# Patient Record
Sex: MALE | Race: BLACK OR AFRICAN AMERICAN | Employment: UNEMPLOYED | URBAN - METROPOLITAN AREA
[De-identification: names, ages, dates, MRNs, and addresses within clinical notes are randomized per-mention and may not be internally consistent; named-entity substitution may affect disease eponyms.]

---
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
## 2021-08-07 ENCOUNTER — HOSPITAL ENCOUNTER (EMERGENCY)
Age: 21
Discharge: HOME | End: 2021-08-07
Attending: EMERGENCY MEDICINE
Payer: MEDICAID

## 2021-08-07 VITALS
TEMPERATURE: 99.9 F | HEIGHT: 71 IN | WEIGHT: 180 LBS | HEART RATE: 93 BPM | OXYGEN SATURATION: 98 % | SYSTOLIC BLOOD PRESSURE: 113 MMHG | RESPIRATION RATE: 16 BRPM | BODY MASS INDEX: 25.2 KG/M2 | DIASTOLIC BLOOD PRESSURE: 64 MMHG

## 2021-08-07 DIAGNOSIS — K12.0 APHTHOUS ULCER: Primary | ICD-10-CM

## 2021-08-07 PROCEDURE — 99283 EMERGENCY DEPT VISIT LOW MDM: CPT

## 2021-08-07 PROCEDURE — 6370000000 HC RX 637 (ALT 250 FOR IP): Performed by: EMERGENCY MEDICINE

## 2021-08-07 RX ORDER — IBUPROFEN 800 MG/1
800 TABLET ORAL 2 TIMES DAILY PRN
Qty: 60 TABLET | Refills: 0 | Status: SHIPPED | OUTPATIENT
Start: 2021-08-07 | End: 2022-04-22

## 2021-08-07 RX ORDER — IBUPROFEN 800 MG/1
800 TABLET ORAL ONCE
Status: COMPLETED | OUTPATIENT
Start: 2021-08-07 | End: 2021-08-07

## 2021-08-07 RX ORDER — ACETAMINOPHEN 500 MG
1000 TABLET ORAL 2 TIMES DAILY PRN
Qty: 60 TABLET | Refills: 0 | Status: ON HOLD | OUTPATIENT
Start: 2021-08-07 | End: 2021-08-19 | Stop reason: HOSPADM

## 2021-08-07 RX ORDER — ACETAMINOPHEN 500 MG
1000 TABLET ORAL ONCE
Status: COMPLETED | OUTPATIENT
Start: 2021-08-07 | End: 2021-08-07

## 2021-08-07 RX ADMIN — ACETAMINOPHEN 1000 MG: 500 TABLET ORAL at 19:50

## 2021-08-07 RX ADMIN — IBUPROFEN 800 MG: 800 TABLET, FILM COATED ORAL at 19:50

## 2021-08-07 ASSESSMENT — PAIN DESCRIPTION - DESCRIPTORS: DESCRIPTORS: SHARP;CONSTANT

## 2021-08-07 ASSESSMENT — PAIN DESCRIPTION - FREQUENCY: FREQUENCY: CONTINUOUS

## 2021-08-07 ASSESSMENT — PAIN DESCRIPTION - LOCATION: LOCATION: OTHER (COMMENT)

## 2021-08-07 ASSESSMENT — PAIN SCALES - GENERAL
PAINLEVEL_OUTOF10: 9
PAINLEVEL_OUTOF10: 9

## 2021-08-07 NOTE — ED NOTES
Pt presents to the er c/o a sore under his tongue pt has a small sore under his tongue that looks like a canker sore     Lazaro Dawn RN  08/07/21 1943

## 2021-08-07 NOTE — ED PROVIDER NOTES
EMERGENCY DEPARTMENT ENCOUNTER    Pt Name: Bettye Spencer  MRN: [id number]  [place] 2000  Date of evaluation: 22  CHIEF COMPLAINT       Chief Complaint   Patient presents with    Other     tongue injury a week ago     HISTORY OF PRESENT ILLNESS   Patient is a 27-year-old male who presents the ED complaining of sores in mouth. For the past 4 days he was only eating citrus food, oranges, grapefruit, etc.,  Because he \"needed eat something \". Yesterday he developed a sore underneath his tongue and his grandma said it was from the high citrus intake. No other issues at this time. No cough, difficulty swallowing. Also, no fevers,shortness of breath, chest pain, abdominal pain, nausea, vomiting, changes in urine or stool. REVIEW OF SYSTEMS     Review of Systems   All other systems reviewed and are negative. PASTMEDICAL HISTORY     Past Medical History:   Diagnosis Date    ADHD (attention deficit hyperactivity disorder)     ON RX    Fracture 2014    LT WRIST    Gestation period, 40 weeks     VANGINAL BIRTH, BIRTH WT 8LB NO COMPLICATIONS    Guardianship     GRANDMOTHER-JUAN IS LEGAL GUARDIAN, MOTHER HAS VISITATION BUT WILL NOT BE PRESENT DAY OF SURGER     SURGICAL HISTORY       Past Surgical History:   Procedure Laterality Date    HARDWARE REMOVAL Left 10/3/14    wrist    WRIST CLOSED REDUCTION Left 2014     CURRENT MEDICATIONS       Previous Medications    DIVALPROEX (DEPAKOTE ER) 500 MG EXTENDED RELEASE TABLET    Take 2 tablets by mouth nightly    PALIPERIDONE (INVEGA) 3 MG EXTENDED RELEASE TABLET    Take 1 tablet by mouth daily     ALLERGIES     has No Known Allergies. FAMILY HISTORY     He indicated that his mother is alive. He indicated that his father is alive. He indicated that both of his sisters are alive. He indicated that both of his brothers are alive. He indicated that his maternal grandmother is alive. He indicated that his maternal grandfather is .  He indicated that his paternal grandmother is alive. He indicated that his paternal grandfather is alive. SOCIAL HISTORY       Social History     Tobacco Use    Smoking status: Passive Smoke Exposure - Never Smoker    Smokeless tobacco: Never Used   Vaping Use    Vaping Use: Some days   Substance Use Topics    Alcohol use: No    Drug use: Yes     Types: Marijuana     PHYSICAL EXAM     INITIAL VITALS: /64   Pulse 93   Temp 99.9 °F (37.7 °C) (Oral)   Resp 16   Ht 5' 11\" (1.803 m)   Wt 180 lb (81.6 kg)   SpO2 98%   BMI 25.10 kg/m²    Physical Exam  HENT:      Head: Normocephalic. Right Ear: External ear normal.      Left Ear: External ear normal.      Nose: Nose normal.      Mouth/Throat:      Tongue: Lesions present. Eyes:      Conjunctiva/sclera: Conjunctivae normal.   Cardiovascular:      Rate and Rhythm: Normal rate. Pulmonary:      Effort: Pulmonary effort is normal.   Abdominal:      General: Abdomen is flat. Skin:     General: Skin is dry. Neurological:      Mental Status: He is alert. Mental status is at baseline. Psychiatric:         Mood and Affect: Mood normal.         Behavior: Behavior normal.         MEDICAL DECISION MAKING:   The patient is hemodynamically stable, afebrile, nontoxic-appearing. Physical exam notable for hypopigmented lesion lingular frenulum. Based on history and exam likely ulcer from trauma or diet. ED plan for NSAIDs, reassurance, discharge. DIAGNOSTIC RESULTS   EKG:All EKG's are interpreted by the Emergency Department Physician who either signs or Co-signs this chart in the absence of a cardiologist.        RADIOLOGY:All plain film, CT, MRI, and formal ultrasound images (except ED bedside ultrasound) are read by the radiologist, see reports below, unless otherwisenoted in MDM or here. No orders to display     LABS: All lab results were reviewed by myself, and all abnormals are listed below.   Labs Reviewed - No data to display    EMERGENCY DEPARTMENTCOURSE:   Patient did well in the ED. Given Rx for Tylenol and ibuprofen  No further work-up indicated at this time. Nursing notes reviewed. At this time this is what I find, the patient appears well and does not appear sick or toxic. I gave my usual and customary discussion of the risks and benefits of discharge versus admission. I answered the patient's questions. I gave the patient strict return precautions. Patient expressed understanding of the discharge instructions. Dictated but not reviewed. Vitals:    Vitals:    08/07/21 1823   BP: 113/64   Pulse: 93   Resp: 16   Temp: 99.9 °F (37.7 °C)   TempSrc: Oral   SpO2: 98%   Weight: 180 lb (81.6 kg)   Height: 5' 11\" (1.803 m)       The patient was given the following medications while in the emergency department:  Orders Placed This Encounter   Medications    ibuprofen (ADVIL;MOTRIN) tablet 800 mg    acetaminophen (TYLENOL) tablet 1,000 mg    acetaminophen (TYLENOL) 500 MG tablet     Sig: Take 2 tablets by mouth 2 times daily as needed for Pain     Dispense:  60 tablet     Refill:  0    ibuprofen (ADVIL;MOTRIN) 800 MG tablet     Sig: Take 1 tablet by mouth 2 times daily as needed for Pain     Dispense:  60 tablet     Refill:  0     CONSULTS:  None    FINAL IMPRESSION      1. Aphthous ulcer          DISPOSITION/PLAN   DISPOSITION Decision To Discharge 08/07/2021 07:43:26 PM      PATIENT REFERRED TO:  Nata Spaulding MD  [address] [place] [address]  Jonnie.  [address] [place] [address]    In 2 days      DISCHARGE MEDICATIONS:  New Prescriptions    ACETAMINOPHEN (TYLENOL) 500 MG TABLET    Take 2 tablets by mouth 2 times daily as needed for Pain    IBUPROFEN (ADVIL;MOTRIN) 800 MG TABLET    Take 1 tablet by mouth 2 times daily as needed for Pain     Tariq Lemon MD  Attending Emergency Physician                    Robin Del Toro MD  08/07/21 8090

## 2021-08-16 ENCOUNTER — HOSPITAL ENCOUNTER (INPATIENT)
Age: 21
LOS: 3 days | Discharge: HOME | DRG: 754 | End: 2021-08-19
Attending: EMERGENCY MEDICINE | Admitting: PSYCHIATRY & NEUROLOGY
Payer: MEDICAID

## 2021-08-16 DIAGNOSIS — F32.A DEPRESSION WITH SUICIDAL IDEATION: Primary | ICD-10-CM

## 2021-08-16 DIAGNOSIS — R45.851 DEPRESSION WITH SUICIDAL IDEATION: Primary | ICD-10-CM

## 2021-08-16 LAB
ABSOLUTE EOS #: 0.1 K/UL (ref 0–0.4)
ABSOLUTE IMMATURE GRANULOCYTE: ABNORMAL K/UL (ref 0–0.3)
ABSOLUTE LYMPH #: 2 K/UL (ref 1.2–5.2)
ABSOLUTE MONO #: 0.5 K/UL (ref 0.1–1.3)
ACETAMINOPHEN LEVEL: <5 UG/ML (ref 10–30)
ALBUMIN SERPL-MCNC: 4.5 G/DL (ref 3.5–5.2)
ALBUMIN/GLOBULIN RATIO: NORMAL (ref 1–2.5)
ALP BLD-CCNC: 86 U/L (ref 40–129)
ALT SERPL-CCNC: 22 U/L (ref 5–41)
ANION GAP SERPL CALCULATED.3IONS-SCNC: 12 MMOL/L (ref 9–17)
AST SERPL-CCNC: 31 U/L
BASOPHILS # BLD: 1 % (ref 0–2)
BASOPHILS ABSOLUTE: 0 K/UL (ref 0–0.2)
BILIRUB SERPL-MCNC: 0.55 MG/DL (ref 0.3–1.2)
BUN BLDV-MCNC: 12 MG/DL (ref 6–20)
BUN/CREAT BLD: NORMAL (ref 9–20)
CALCIUM SERPL-MCNC: 9.6 MG/DL (ref 8.6–10.4)
CHLORIDE BLD-SCNC: 103 MMOL/L (ref 98–107)
CO2: 26 MMOL/L (ref 20–31)
CREAT SERPL-MCNC: 0.9 MG/DL (ref 0.7–1.2)
DIFFERENTIAL TYPE: ABNORMAL
EOSINOPHILS RELATIVE PERCENT: 1 % (ref 0–4)
ETHANOL PERCENT: <0.01 %
ETHANOL: <10 MG/DL
GFR AFRICAN AMERICAN: >60 ML/MIN
GFR NON-AFRICAN AMERICAN: >60 ML/MIN
GFR SERPL CREATININE-BSD FRML MDRD: NORMAL ML/MIN/{1.73_M2}
GFR SERPL CREATININE-BSD FRML MDRD: NORMAL ML/MIN/{1.73_M2}
GLUCOSE BLD-MCNC: 84 MG/DL (ref 70–99)
HCT VFR BLD CALC: 40.1 % (ref 41–53)
HEMOGLOBIN: 12.9 G/DL (ref 13.5–17.5)
IMMATURE GRANULOCYTES: ABNORMAL %
LYMPHOCYTES # BLD: 23 % (ref 25–45)
MCH RBC QN AUTO: 26.9 PG (ref 26–34)
MCHC RBC AUTO-ENTMCNC: 32.3 G/DL (ref 31–37)
MCV RBC AUTO: 83.4 FL (ref 80–100)
MONOCYTES # BLD: 5 % (ref 2–8)
NRBC AUTOMATED: ABNORMAL PER 100 WBC
PDW BLD-RTO: 14.4 % (ref 11.5–14.9)
PLATELET # BLD: 358 K/UL (ref 150–450)
PLATELET ESTIMATE: ABNORMAL
PMV BLD AUTO: 6.9 FL (ref 6–12)
POTASSIUM SERPL-SCNC: 4.2 MMOL/L (ref 3.7–5.3)
RBC # BLD: 4.8 M/UL (ref 4.5–5.9)
RBC # BLD: ABNORMAL 10*6/UL
SALICYLATE LEVEL: <1 MG/DL (ref 3–10)
SARS-COV-2, RAPID: NOT DETECTED
SEG NEUTROPHILS: 70 % (ref 34–64)
SEGMENTED NEUTROPHILS ABSOLUTE COUNT: 6.4 K/UL (ref 1.3–9.1)
SODIUM BLD-SCNC: 141 MMOL/L (ref 135–144)
SPECIMEN DESCRIPTION: NORMAL
TOTAL PROTEIN: 8.1 G/DL (ref 6.4–8.3)
WBC # BLD: 8.9 K/UL (ref 4.5–13.5)
WBC # BLD: ABNORMAL 10*3/UL

## 2021-08-16 PROCEDURE — 90792 PSYCH DIAG EVAL W/MED SRVCS: CPT | Performed by: PSYCHIATRY & NEUROLOGY

## 2021-08-16 PROCEDURE — APPSS30 APP SPLIT SHARED TIME 16-30 MINUTES: Performed by: PSYCHIATRY & NEUROLOGY

## 2021-08-16 PROCEDURE — 80307 DRUG TEST PRSMV CHEM ANLYZR: CPT

## 2021-08-16 PROCEDURE — 87635 SARS-COV-2 COVID-19 AMP PRB: CPT

## 2021-08-16 PROCEDURE — 85025 COMPLETE CBC W/AUTO DIFF WBC: CPT

## 2021-08-16 PROCEDURE — G0480 DRUG TEST DEF 1-7 CLASSES: HCPCS

## 2021-08-16 PROCEDURE — 36415 COLL VENOUS BLD VENIPUNCTURE: CPT

## 2021-08-16 PROCEDURE — 99285 EMERGENCY DEPT VISIT HI MDM: CPT

## 2021-08-16 PROCEDURE — 80143 DRUG ASSAY ACETAMINOPHEN: CPT

## 2021-08-16 PROCEDURE — 80053 COMPREHEN METABOLIC PANEL: CPT

## 2021-08-16 PROCEDURE — 80179 DRUG ASSAY SALICYLATE: CPT

## 2021-08-16 PROCEDURE — 1240000000 HC EMOTIONAL WELLNESS R&B

## 2021-08-16 RX ORDER — DIPHENHYDRAMINE HYDROCHLORIDE 50 MG/ML
50 INJECTION INTRAMUSCULAR; INTRAVENOUS EVERY 4 HOURS PRN
Status: DISCONTINUED | OUTPATIENT
Start: 2021-08-16 | End: 2021-08-19 | Stop reason: HOSPADM

## 2021-08-16 RX ORDER — POLYETHYLENE GLYCOL 3350 17 G/17G
17 POWDER, FOR SOLUTION ORAL DAILY PRN
Status: DISCONTINUED | OUTPATIENT
Start: 2021-08-16 | End: 2021-08-19 | Stop reason: HOSPADM

## 2021-08-16 RX ORDER — HALOPERIDOL 5 MG
5 TABLET ORAL EVERY 4 HOURS PRN
Status: DISCONTINUED | OUTPATIENT
Start: 2021-08-16 | End: 2021-08-19 | Stop reason: HOSPADM

## 2021-08-16 RX ORDER — HALOPERIDOL 5 MG/ML
5 INJECTION INTRAMUSCULAR EVERY 4 HOURS PRN
Status: DISCONTINUED | OUTPATIENT
Start: 2021-08-16 | End: 2021-08-19 | Stop reason: HOSPADM

## 2021-08-16 RX ORDER — ACETAMINOPHEN 325 MG/1
650 TABLET ORAL EVERY 4 HOURS PRN
Status: DISCONTINUED | OUTPATIENT
Start: 2021-08-16 | End: 2021-08-19 | Stop reason: HOSPADM

## 2021-08-16 RX ORDER — MAGNESIUM HYDROXIDE/ALUMINUM HYDROXICE/SIMETHICONE 120; 1200; 1200 MG/30ML; MG/30ML; MG/30ML
30 SUSPENSION ORAL EVERY 6 HOURS PRN
Status: DISCONTINUED | OUTPATIENT
Start: 2021-08-16 | End: 2021-08-19 | Stop reason: HOSPADM

## 2021-08-16 RX ORDER — IBUPROFEN 200 MG
400 TABLET ORAL EVERY 6 HOURS PRN
Status: DISCONTINUED | OUTPATIENT
Start: 2021-08-16 | End: 2021-08-19 | Stop reason: HOSPADM

## 2021-08-16 RX ORDER — TRAZODONE HYDROCHLORIDE 50 MG/1
50 TABLET ORAL NIGHTLY PRN
Status: DISCONTINUED | OUTPATIENT
Start: 2021-08-16 | End: 2021-08-19 | Stop reason: HOSPADM

## 2021-08-16 RX ORDER — HYDROXYZINE 50 MG/1
50 TABLET, FILM COATED ORAL 3 TIMES DAILY PRN
Status: DISCONTINUED | OUTPATIENT
Start: 2021-08-16 | End: 2021-08-19 | Stop reason: HOSPADM

## 2021-08-16 RX ORDER — LORAZEPAM 2 MG/ML
2 INJECTION INTRAMUSCULAR EVERY 4 HOURS PRN
Status: DISCONTINUED | OUTPATIENT
Start: 2021-08-16 | End: 2021-08-19 | Stop reason: HOSPADM

## 2021-08-16 RX ORDER — LORAZEPAM 1 MG/1
2 TABLET ORAL EVERY 4 HOURS PRN
Status: DISCONTINUED | OUTPATIENT
Start: 2021-08-16 | End: 2021-08-19 | Stop reason: HOSPADM

## 2021-08-16 ASSESSMENT — ENCOUNTER SYMPTOMS
BACK PAIN: 0
SHORTNESS OF BREATH: 0
ABDOMINAL PAIN: 0

## 2021-08-16 ASSESSMENT — PATIENT HEALTH QUESTIONNAIRE - PHQ9: SUM OF ALL RESPONSES TO PHQ QUESTIONS 1-9: 6

## 2021-08-16 ASSESSMENT — SLEEP AND FATIGUE QUESTIONNAIRES
DO YOU HAVE DIFFICULTY SLEEPING: NO
AVERAGE NUMBER OF SLEEP HOURS: 6
DO YOU USE A SLEEP AID: NO

## 2021-08-16 ASSESSMENT — LIFESTYLE VARIABLES
HISTORY_ALCOHOL_USE: NO
HISTORY_ALCOHOL_USE: NO
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
## 2021-08-16 NOTE — GROUP NOTE
Group Therapy Note    Date: 8/16/2021    Group Start Time: 1430  Group End Time: 1585  Group Topic: Cognitive Skills    BALDO SAWYER A    Geanie Fluke    Patient declined to attend social skills group at 1430 despite encouragement from staff. 1:1 talk time offered by staff as alternative to group session.         Signature:  Juju Vergara

## 2021-08-16 NOTE — BH NOTE
Suicide precautions discontinued. Patient denies suicidal ideations upon admission. Patient rounded on every 15 minutes and at regular intervals per policy. Doctor aware. No order for 1:1 observation. Will continue to monitor.
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
## 2021-08-16 NOTE — CARE COORDINATION
clearance from a psychiatrist so he can join the [employer]. He stated \"my whole life has been shit and I know with the  it can be better\". Marijuana use is reported but patient denies any other substance use. Patient presented with good insight, good eye contact, and no thought blocking. He does have racing thoughts and reports past diagnosis of ADHD as a child.  will continue to offer support and encouragement to patient as needed.

## 2021-08-16 NOTE — BH NOTE
[facility]  Admission Note     Admission Type:   Admission Type: Voluntary    Reason for admission:  Reason for Admission: Suicidal ideation to run in traffic, overdose, or death by     PATIENT STRENGTHS:  Strengths: Connection to output provider, Positive Support (Zepf)    Patient Strengths and Limitations:  Limitations: General negative or hopeless attitude about future/recovery, Hopeless about future    Addictive Behavior:   Addictive Behavior  In the past 3 months, have you felt or has someone told you that you have a problem with:  : None  Do you have a history of Chemical Use?: No  Do you have a history of Alcohol Use?: No  Do you have a history of Street Drug Abuse?: Yes (\"marijuana\")  Histroy of Prescripton Drug Abuse?: No    Medical Problems:   Past Medical History:   Diagnosis Date    ADHD (attention deficit hyperactivity disorder) 2005    ON RX    Fracture 08/2014    LT WRIST    Gestation period, 40 weeks     VANGINAL BIRTH, BIRTH WT 8LB NO COMPLICATIONS    Guardianship     GRANDMOTHER-JUAN IS LEGAL GUARDIAN, MOTHER HAS VISITATION BUT WILL NOT BE PRESENT DAY OF SURGER       Status EXAM:  Status and Exam  Normal: No  Facial Expression: Avoids Gaze, Worried, Sad  Affect: Congruent  Level of Consciousness: Alert  Mood:Normal: No  Mood: Depressed, Sad  Motor Activity:Normal: Yes  Interview Behavior: Cooperative, Evasive  Preception: [place] to Person, Nathalie Cheikh to Time, [place] to Place, [place] to Situation  Attention:Normal: No  Attention: Distractible  Thought Processes: Blocking  Thought Content:Normal: No  Thought Content: Preoccupations  Hallucinations: None  Delusions: No  Memory:Normal: Yes  Insight and Judgment: No  Insight and Judgment: Poor Judgment, Poor Insight  Present Suicidal Ideation: No (denies)  Present Homicidal Ideation: No    Tobacco Screening:  Practical Counseling, on admission, lisha X, if applicable and completed (first 3 are required if patient doesn't refuse): ( )  Recognizing danger situations (included triggers and roadblocks)                    ( )  Coping skills (new ways to manage stress, exercise, relaxation techniques, changing routine, distraction)                                                           ( )  Basic information about quitting (benefits of quitting, techniques in how to quit, available resources  ( ) Referral for counseling faxed to [facility]                                           ( ) Patient refused counseling  (X) Patient has not smoked in the last 30 days    Metabolic Screening:    Lab Results   Component Value Date    LABA1C 5.6 04/11/2020       Lab Results   Component Value Date    CHOL 132 04/11/2020     Lab Results   Component Value Date    TRIG 51 04/11/2020     Lab Results   Component Value Date    HDL 37 (L) 04/11/2020     No components found for: LDLCAL  No results found for: LABVLDL      There is no height or weight on file to calculate BMI. BP Readings from Last 2 Encounters:   08/16/21 134/79   08/07/21 113/64           Pt admitted with followings belongings:  Dentures: None  Vision - Corrective Lenses: None  Hearing Aid: None  Jewelry: None  Body Piercings Removed: N/A  Clothing: Footwear, Pants, Shirt, Socks  Were All Patient Medications Collected?: Not Applicable  Other Valuables: Cell phone, [facility]     Patient's home medications were verified. Patient oriented to surroundings and program expectations and copy of patient rights given. Received admission packet:  Yes. Consents reviewed, signed all. Refused none. Patient verbalize understanding: yes. Patient education on precautions: yes                 Jesús Waite RN       Patient voluntary from [facility] AN AFFILIATE OF [facility] for suicidal ideations to overdose, run into traffic, or death by . Patient has been off Invega injection for 9 weeks, stating \"serves me no purpose. \" Linked to [facility]. Patient states \"I am fed up with life. \" Calm and cooperative with admission process, all consents signed. Routines started and home medications verified and completed. Patient states he \"will not take medications while here. \" NP notified of admission.

## 2021-08-16 NOTE — GROUP NOTE
Group Therapy Note    Date: 8/16/2021    Group Start Time: 1100  Group End Time: 1150  Group Topic: Cognitive Skills    BALDO Becerra    Patient declined to attend social skills group at 1100 despite encouragement from staff. 1:1 talk time offered by staff as alternative to group session.           Signature:  Melissa Becerra

## 2021-08-16 NOTE — ED NOTES
[facility] called this date Dr Colby Schaumann to admit under MDD with SI.  Bed request and voluntary faxed to [facility].

## 2021-08-16 NOTE — PLAN OF CARE
[facility]  Initial Interdisciplinary Treatment Plan NO      Original treatment plan Date & Time: 8/16/2021  1113    Admission Type:  Admission Type: Voluntary    Reason for admission:   Reason for Admission: Suicidal ideation to run in traffic, overdose, or death by     Estimated Length of Stay:  5-7days  Estimated Discharge Date: to be determined by physician    PATIENT STRENGTHS:  Patient Strengths:Strengths: Connection to output provider, Positive Support (Zepf)  Patient Strengths and Limitations:Limitations: General negative or hopeless attitude about future/recovery, Hopeless about future  Addictive Behavior:    Medical Problems:  Past Medical History:   Diagnosis Date    ADHD (attention deficit hyperactivity disorder) 2005    ON RX    Fracture 08/2014    LT WRIST    Gestation period, 40 weeks     VANGINAL BIRTH, BIRTH WT 8LB NO COMPLICATIONS    Guardianship     GRANDMOTHER-JUAN IS LEGAL GUARDIAN, MOTHER HAS VISITATION BUT WILL NOT BE PRESENT DAY OF SURGER     Status EXAM:Status and Exam  Normal: No  Facial Expression: Avoids Gaze, Worried, Sad  Affect: Congruent  Level of Consciousness: Alert  Mood:Normal: No  Mood: Depressed, Sad  Motor Activity:Normal: Yes  Interview Behavior: Cooperative, Evasive  Preception: [place] to Person, Burnetta Abelson to Time, [place] to Place, [place] to Situation  Attention:Normal: No  Attention: Distractible  Thought Processes: Blocking  Thought Content:Normal: No  Thought Content: Preoccupations  Hallucinations: None  Delusions: No  Memory:Normal: Yes  Insight and Judgment: No  Insight and Judgment: Poor Judgment, Poor Insight  Present Suicidal Ideation: No (denies)  Present Homicidal Ideation: No    EDUCATION:   Learner Progress Toward Treatment Goals: reviewed group plans and strategies for care    Method:group therapy, medication compliance, individualized assessments and care planning    Outcome: needs reinforcement    PATIENT GOALS: to be discussed with patient within 72 hours    PLAN/TREATMENT RECOMMENDATIONS:     continue group therapy , medications compliance, goal setting, individualized assessments and care, continue to monitor pt on unit      SHORT-TERM GOALS:   Time frame for Short-Term Goals: 5-7 days    LONG-TERM GOALS:  Time frame for Long-Term Goals: 6 months  Members Present in Team Meeting: See Signature Sheet    TERESA Guerra
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
## 2021-08-16 NOTE — ED TRIAGE NOTES
Mode of arrival (squad #, walk in, police, etc) : [address] [facility]        Chief complaint(s): Suicidal        Arrival Note (brief scenario, treatment PTA, etc). : Pt arrives to ED c/o suicidal ideation. Patient states that these thoughts started this morning. Patient states that he took himself off his Oral Freda about 9 months ago. Patient states that he does not feel like he needs medication, he wants help with coping mechanisms. Patient is not on any daily medications at this time. Patient reports multiple plans for his suicidal thoughts including \"death by \" or running in front of a car. Patient reports his most recent attempt was in January of this year. Patient denies any homicidal ideation. Patient denies any pain. Patient is calm and cooperative with staff. C= \"Have you ever felt that you should Cut down on your drinking? \"  No  A= \"Have people Annoyed you by criticizing your drinking? \"  No  G= \"Have you ever felt bad or Guilty about your drinking? \"  No  E= \"Have you ever had a drink as an Eye-opener first thing in the morning to steady your nerves or to help a hangover? \"  No      Deferred []      Reason for deferring: N/A    *If yes to two or more: probable alcohol abuse. *

## 2021-08-16 NOTE — PROGRESS NOTES
Behavioral Services  Medicare Certification Upon Admission    I certify that this patient's inpatient psychiatric hospital admission is medically necessary for:    [x] (1) Treatment which could reasonably be expected to improve this patient's condition,       [x] (2) Or for diagnostic study;     AND     [x](2) The inpatient psychiatric services are provided while the individual is under the care of a physician and are included in the individualized plan of care.     Estimated length of stay/service 3-5 days    Plan for post-hospital care [id number]    Electronically signed by Agnes Suarez MD on 8/16/2021 at 2:02 PM

## 2021-08-16 NOTE — ED PROVIDER NOTES
Current Discharge Medication List      CONTINUE these medications which have NOT CHANGED    Details   acetaminophen (TYLENOL) 500 MG tablet Take 2 tablets by mouth 2 times daily as needed for Pain  Qty: 60 tablet, Refills: 0      ibuprofen (ADVIL;MOTRIN) 800 MG tablet Take 1 tablet by mouth 2 times daily as needed for Pain  Qty: 60 tablet, Refills: 0      divalproex (DEPAKOTE ER) 500 MG extended release tablet Take 2 tablets by mouth nightly  Qty: 30 tablet, Refills: 3      paliperidone (INVEGA) 3 MG extended release tablet Take 1 tablet by mouth daily  Qty: 30 tablet, Refills: 3             ALLERGIES     has No Known Allergies. FAMILY HISTORY     He indicated that his mother is alive. He indicated that his father is alive. He indicated that both of his sisters are alive. He indicated that both of his brothers are alive. He indicated that his maternal grandmother is alive. He indicated that his maternal grandfather is . He indicated that his paternal grandmother is alive. He indicated that his paternal grandfather is alive. SOCIAL HISTORY      reports that he is a non-smoker but has been exposed to tobacco smoke. He has never used smokeless tobacco. He reports current drug use. Drug: Marijuana. He reports that he does not drink alcohol. PHYSICAL EXAM     INITIAL VITALS: /60   Pulse 67   Temp 97.9 °F (36.6 °C) (Oral)   Resp 14   Ht 5' 11\" (1.803 m)   Wt 180 lb (81.6 kg)   SpO2 98%   BMI 25.10 kg/m²   Gen: NAD  Head: Normocephalic, atraumatic  Eye: Pupils equal round reactive to light, no conjunctivitis  Heart: Regular rate and rhythm no murmurs  Lungs: Clear to auscultation bilaterally, no respiratory distress  Chest wall: No crepitus, no tenderness palpation  Abdomen: Soft, nontender, nondistended, with no peritoneal signs  Skin: No diaphoresis. no lacerations.   Neurologic: Patient is alert and oriented x3, motor and sensation is intact in all 4 extremities, speech is fluent  Extremities: Full range of motion, no cyanosis, no edema, no signs of trauma, no tenderness to palpation    MEDICAL DECISION MAKING:     MDM  22 y.o. male with depression, suicidal thoughts, suicidal plan. previous suicide attempt. The patient does not appear intoxicated. The patient is showing no signs of any acute active toxidrome. The patient denies any overdose attempt or  medical complaints at this time. We'll screen for any acetaminophen or salicylate ingestion, we'll check baseline renal function, liver function, a drug screen, cbc and reassess. Hospital Course:     Laboratory studies reviewed and unremarkable. Patient is medically clear for psychiatric evaluation.  will discuss the case with the psychiatry service, anticipate inpatient psychiatric admission. DIAGNOSTIC RESULTS     LABS: All lab results were reviewed by myself, and all abnormals are listed below.   Labs Reviewed   CBC WITH AUTO DIFFERENTIAL - Abnormal; Notable for the following components:       Result Value    Hemoglobin 12.9 (*)     Hematocrit 40.1 (*)     Seg Neutrophils 70 (*)     Lymphocytes 23 (*)     All other components within normal limits   ACETAMINOPHEN LEVEL - Abnormal; Notable for the following components:    Acetaminophen Level <5 (*)     All other components within normal limits   SALICYLATE LEVEL - Abnormal; Notable for the following components:    Salicylate Lvl <1 (*)     All other components within normal limits   COVID-19, RAPID   COMPREHENSIVE METABOLIC PANEL   ETHANOL   URINE DRUG SCREEN       EMERGENCY DEPARTMENT COURSE:   Vitals:    Vitals:    08/16/21 1132 08/16/21 2015 08/17/21 0800 08/17/21 2015   BP:  108/65 (!) 120/44 122/60   Pulse:  87 57 67   Resp:  14 14 14   Temp:  98.6 °F (37 °C) 97.8 °F (36.6 °C) 97.9 °F (36.6 °C)   TempSrc:   Oral Oral   SpO2:       Weight: 180 lb (81.6 kg)      Height: 5' 11\" (1.803 m)        The patient was given the following medications while in the emergency department:  Orders Placed This Encounter   Medications    acetaminophen (TYLENOL) tablet 650 mg    aluminum & magnesium hydroxide-simethicone (MAALOX) 200-200-20 MG/5ML suspension 30 mL    hydrOXYzine (ATARAX) tablet 50 mg    ibuprofen (ADVIL;MOTRIN) tablet 400 mg    traZODone (DESYREL) tablet 50 mg    polyethylene glycol (GLYCOLAX) packet 17 g    nicotine polacrilex (NICORETTE) gum 2 mg    AND Linked Order Group     haloperidol (HALDOL) tablet 5 mg     LORazepam (ATIVAN) tablet 2 mg    AND Linked Order Group     haloperidol lactate (HALDOL) injection 5 mg     LORazepam (ATIVAN) injection 2 mg     diphenhydrAMINE (BENADRYL) injection 50 mg    mirtazapine (REMERON) tablet 7.5 mg   -------------------------  CRITICAL CARE:   CONSULTS: None  PROCEDURES: Procedures     FINAL IMPRESSION      1. Depression with suicidal ideation          DISPOSITION/PLAN   DISPOSITION      PATIENT REFERRED TO:  No follow-up provider specified.     DISCHARGE MEDICATIONS:  Current Discharge Medication List        Iván Keith MD  Attending Emergency Physician        Iván Keith MD  08/17/21 1586

## 2021-08-17 PROCEDURE — 99232 SBSQ HOSP IP/OBS MODERATE 35: CPT | Performed by: PSYCHIATRY & NEUROLOGY

## 2021-08-17 PROCEDURE — 1240000000 HC EMOTIONAL WELLNESS R&B

## 2021-08-17 PROCEDURE — APPSS30 APP SPLIT SHARED TIME 16-30 MINUTES: Performed by: PSYCHIATRY & NEUROLOGY

## 2021-08-17 RX ORDER — MIRTAZAPINE 15 MG/1
7.5 TABLET, FILM COATED ORAL NIGHTLY
Status: DISCONTINUED | OUTPATIENT
Start: 2021-08-17 | End: 2021-08-19 | Stop reason: HOSPADM

## 2021-08-17 NOTE — GROUP NOTE
Group Therapy Note    Date: 8/17/2021    Group Start Time: 1330  Group End Time: 7984  Group Topic: Recreational     [address]        Group Therapy Note    Attendees: 4/16         Patient's Goal:  To demonstrate improved concentration using recreation    Notes:  Patient was pleasant and appropriate throughout the session     Status After Intervention:  Improved    Participation Level:  Active Listener and Interactive    Participation Quality: Appropriate, Attentive, Sharing and Supportive      Speech:  normal      Thought Process/Content: Logical      Affective Functioning:flat    Mood: depressed       Level of consciousness:  Alert, Oriented       Response to Learning: Able to verbalize current knowledge/experience, Able to verbalize/acknowledge new learning, Capable of insight and Progressing to goal      Endings: None Reported    Modes of Intervention: Education, Support, Socialization, Exploration, Clarifying and Problem-solving      Discipline Responsible: Psychoeducational Specialist      Signature:  Sarah Gipson

## 2021-08-17 NOTE — PLAN OF CARE
Problem: Suicide risk  Goal: Provide patient with safe environment  Description: Provide patient with safe environment  8/16/2021 2301 by Zeno Hodgkin, RN  Outcome: Ongoing   Safe environment provided. Problem: Altered Mood, Depressive Behavior:  Goal: Ability to disclose and discuss suicidal ideas will improve  Description: Ability to disclose and discuss suicidal ideas will improve  8/16/2021 2301 by Zeno Hodgkin, RN  Outcome: Ongoing   Denies current suicidal ideation.

## 2021-08-17 NOTE — PLAN OF CARE
[address] [place] [address]  Day 3 Interdisciplinary Treatment Plan NOTE    Review Date & Time: 8/17/2021 0933    Admission Type:   Admission Type: Voluntary    Reason for admission:  Reason for Admission: Suicidal ideation to run in traffic, overdose, or death by   Estimated Length of Stay: 5-7 days  Estimated Discharge Date Update: to be determined by physician    PATIENT STRENGTHS:  Patient Strengths Strengths: Connection to output provider, Positive Support (Zepf)  Patient Strengths and Limitations:Limitations: Difficulty problem solving/relies on others to help solve problems  Addictive Behavior:Addictive Behavior  In the past 3 months, have you felt or has someone told you that you have a problem with:  : None  Do you have a history of Chemical Use?: No  Do you have a history of Alcohol Use?: No  Do you have a history of Street Drug Abuse?: Yes  Histroy of Prescripton Drug Abuse?: No  Medical Problems:  Past Medical History:   Diagnosis Date    ADHD (attention deficit hyperactivity disorder) 2005    ON RX    Fracture 08/2014    LT WRIST    Gestation period, 40 weeks     VANGINAL BIRTH, BIRTH WT 8LB NO COMPLICATIONS    Guardianship     GRANDMOTHER-JUAN IS LEGAL GUARDIAN, MOTHER HAS VISITATION BUT WILL NOT BE PRESENT DAY OF SURGER       Risk:  Fall RiskTotal: 55  Yonathan Scale Yonathan Scale Score: 22  BVC Total: 0  Change in scores no Changes to plan of Care no    Status EXAM:   Status and Exam  Normal: No  Facial Expression: Brightened  Affect: Appropriate  Level of Consciousness: Alert  Mood:Normal: No  Mood: Depressed  Motor Activity:Normal: Yes  Interview Behavior: Cooperative, Evasive  Preception: [place] to Person, Shyrl Plump to Time, [place] to Place, [place] to Situation  Attention:Normal: Yes  Attention: Distractible  Thought Processes: Blocking  Thought Content:Normal: Yes  Thought Content: Preoccupations  Hallucinations: None  Delusions: No  Memory:Normal: Yes  Insight and Judgment: No  Insight and Judgment: Poor Insight  Present Suicidal Ideation: No  Present Homicidal Ideation: No    Daily Assessment Last Entry:             Patient Currently in Pain: No       Patient Monitoring:       Psychiatric Symptoms:                    Suicide Risk CSSR-S:  1) Within the past month, have you wished you were dead or wished you could go to sleep and not wake up? : Yes (\"Fed up with life\" \"I don't want to be here anymore\")  2) Have you actually had any thoughts of killing yourself? : Yes  3) Have you been thinking about how you might kill yourself? : Yes  5) Have you started to work out or worked out the details of how to kill yourself?  Do you intend to carry out this plan? : Yes  6) Have you ever done anything, started to do anything, or prepared to do anything to end your life?: Yes  Change in Result no Change in Plan of care no      EDUCATION:   EDUCATION:   Learner Progress Toward Treatment Goals: Reviewed results and recommendations of this team, Reviewed group plan and strategies, Reviewed signs, symptoms and risk of self harm and violent behavior, Reviewed goals and plan of care    Method:small group, individual verbal education    Outcome:verbalized by patient, but needs reinforcement to obtain goals    PATIENT GOALS:  Short term: pt refuses to attend tx planning to develop goals   Long term: pt refuses to attend tx planning to develop goals    PLAN/TREATMENT RECOMMENDATIONS UPDATE: continue with group therapies, increased socialization, continue planning for after discharge goals, continue with medication compliance    SHORT-TERM GOALS UPDATE:   Time frame for Short-Term Goals: 5-7 days    LONG-TERM GOALS UPDATE:   Time frame for Long-Term Goals: 6 months  Members Present in Team Meeting: See Signature Sheet    TERESA Diaz

## 2021-08-17 NOTE — GROUP NOTE
Group Therapy Note    Date: 8/16/2021    Group Start Time: 2030  Group End Time: 2128  Group Topic: Wrap-Up    [facility]        Group Therapy Note    Attendees: 8         Patient's Goal:  I have been through a lot, my family is no help to me I only have 1 support person I can turn to. Notes:  I got lots of goals, I really want to go into the service. Status After Intervention:  Improved    Participation Level:  Active Listener    Participation Quality: Appropriate, Attentive and Sharing      Speech:  normal      Thought Process/Content: Linear      Affective Functioning: Congruent      Mood: anxious      Level of consciousness:  Alert, Oriented x4 and Attentive      Response to Learning: Able to verbalize/acknowledge new learning      Endings: None Reported    Modes of Intervention: Problem-solving      Discipline Responsible: [employer]      Signature:  Juana Wesley

## 2021-08-17 NOTE — PLAN OF CARE
Problem: Suicide risk  Goal: Provide patient with safe environment  Description: Provide patient with safe environment  8/17/2021 1130 by Concetta Cerna  Outcome: Ongoing  Pt denies suicidal thoughts when questioned. Isolates in room for long intervals. Pt. Remains safe on the unit. Q 15 minute checks for safety maintained. Problem: Altered Mood, Depressive Behavior:  Goal: Able to verbalize acceptance of life and situations over which he or she has no control  Description: Able to verbalize acceptance of life and situations over which he or she has no control  8/17/2021 1130 by Concetta Cerna  Outcome: Ongoing  Pt out for meals only. Refuses to take any medication and states it does not help him to take medication. Denies hallucinations. Problem: Altered Mood, Depressive Behavior:  Goal: Able to verbalize and/or display a decrease in depressive symptoms  Description: Able to verbalize and/or display a decrease in depressive symptoms  8/17/2021 1130 by Concetta Cerna  Outcome: Ongoing  Isolates in bed. Allows vitals. Answers questions posed. Pt is controlled and cooperative when approached. Problem: Altered Mood, Depressive Behavior:  Goal: Ability to disclose and discuss suicidal ideas will improve  Description: Ability to disclose and discuss suicidal ideas will improve  8/17/2021 1130 by Concetta Cerna  Outcome: Ongoing  Pt denies suicidal thoughts. Pt. Remains safe on the unit. Q 15 minute checks for safety maintained.

## 2021-08-17 NOTE — PROGRESS NOTES
BEHAVIORAL HEALTH FOLLOW-UP NOTE     8/17/2021     Patient was seen and examined in person, Chart reviewed   Patient's case discussed with staff/team   Patient attending groups: No   Patient compliant with medication: Declines offered medications    Chief Complaint: Depression with suicidal ideation    Interim History:   Nursing staff reports that patient has continued to isolate in his room for large percentage of the time since his admission. He has not required emergency or needed medications and continues to decline offered Remeron. Today he reports his mood is \"fine \"and denies suicidal ideation. He remains very fixated on receiving clearance so that he may join the [employer]. He declines offered to attend group programming and reports that the food this morning for for breakfast was not his usual fresh fruits. Explored his symptoms of depression and the benefits that Remeron would offer and he is insistent that he would like therapy only at this time. He currently contracts for safety on this unit. The team will continue to monitor him closely and work to establish therapeutic alliance. He denies having questions or concerns currently.     Appetite:   [] Normal/Unchanged  [] Increased  [] Decreased      Sleep:       [x] Normal/Unchanged  [] Fair       [] Poor              Energy:    [] Normal/Unchanged  [] Increased  [x] Decreased        Aggression:  [] yes  [x] no    Patient is [x] able  [] unable to CONTRACT FOR SAFETY ON THE UNIT    PAST MEDICAL/PSYCHIATRIC HISTORY:   Past Medical History:   Diagnosis Date    ADHD (attention deficit hyperactivity disorder) 2005    ON RX    Fracture 08/2014    LT WRIST    Gestation period, 40 weeks     VANGINAL BIRTH, BIRTH WT 8LB NO COMPLICATIONS    Guardianship     GRANDMOTHER-JUAN IS LEGAL GUARDIAN, MOTHER HAS VISITATION BUT WILL NOT BE PRESENT DAY OF SURGER       FAMILY/SOCIAL HISTORY:  Family History   Problem Relation Age of Onset    Diabetes Paternal Grandmother      Social History     Socioeconomic History    Marital status: Single     Spouse name: Not on file    Number of children: Not on file    Years of education: Not on file    Highest education level: Not on file   Occupational History    Not on file   Tobacco Use    Smoking status: Passive Smoke Exposure - Never Smoker    Smokeless tobacco: Never Used   Vaping Use    Vaping Use: Some days   Substance and Sexual Activity    Alcohol use: No    Drug use: Yes     Types: Marijuana    Sexual activity: Not on file   Other Topics Concern    Not on file   Social History Narrative    Not on file     Social Determinants of Health     Financial Resource Strain:     Difficulty of Paying Living Expenses:    Food Insecurity:     Worried About Running Out of Food in the Last Year:     Ran Out of Food in the Last Year:    Transportation Needs:     Lack of Transportation (Medical):  Lack of Transportation (Non-Medical):    Physical Activity:     Days of Exercise per Week:     Minutes of Exercise per Session:    Stress:     Feeling of Stress :    Social Connections:     Frequency of Communication with Friends and Family:     Frequency of Social Gatherings with Friends and Family:     Attends Tenriism Services:     Active Member of Clubs or Organizations:     Attends Club or Organization Meetings:     Marital Status:    Intimate Partner Violence:     Fear of Current or Ex-Partner:     Emotionally Abused:     Physically Abused:     Sexually Abused:            ROS:  [x] All negative/unchanged except if checked.  Explain positive(checked items) below:  [] Constitutional  [] Eyes  [] Ear/Nose/Mouth/Throat  [] Respiratory  [] CV  [] GI  []   [] Musculoskeletal  [] Skin/Breast  [] Neurological  [] Endocrine  [] Heme/Lymph  [] Allergic/Immunologic    Explanation:     MEDICATIONS:    Current Facility-Administered Medications:     mirtazapine (REMERON) tablet 7.5 mg, 7.5 mg, Oral, Nightly, Kaycee Edd Ríos APRN - CNP    acetaminophen (TYLENOL) tablet 650 mg, 650 mg, Oral, Q4H PRN, Shanita Saleh MD    aluminum & magnesium hydroxide-simethicone (MAALOX) 200-200-20 MG/5ML suspension 30 mL, 30 mL, Oral, Q6H PRN, Shanita Saleh MD    hydrOXYzine (ATARAX) tablet 50 mg, 50 mg, Oral, TID PRN, Shanita Saleh MD    ibuprofen (ADVIL;MOTRIN) tablet 400 mg, 400 mg, Oral, Q6H PRN, Shanita Saleh MD    traZODone (DESYREL) tablet 50 mg, 50 mg, Oral, Nightly PRN, Shanita Saleh MD    polyethylene glycol (GLYCOLAX) packet 17 g, 17 g, Oral, Daily PRN, Shanita Saleh MD    nicotine polacrilex (NICORETTE) gum 2 mg, 2 mg, Oral, Q1H PRN, Shanita Saleh MD    haloperidol (HALDOL) tablet 5 mg, 5 mg, Oral, Q4H PRN **AND** LORazepam (ATIVAN) tablet 2 mg, 2 mg, Oral, Q4H PRN, Shanita Saleh MD    haloperidol lactate (HALDOL) injection 5 mg, 5 mg, Intramuscular, Q4H PRN **AND** LORazepam (ATIVAN) injection 2 mg, 2 mg, Intramuscular, Q4H PRN **AND** diphenhydrAMINE (BENADRYL) injection 50 mg, 50 mg, Intramuscular, Q4H PRN, Shanita Saleh MD      Examination:  BP (!) 120/44   Pulse 57   Temp 97.8 °F (36.6 °C) (Oral)   Resp 14   Ht 5' 11\" (1.803 m)   Wt 180 lb (81.6 kg)   SpO2 98%   BMI 25.10 kg/m²   Gait - steady  Medication side effects(SE): None applicable as patient is currently declining medications    Mental Status Examination:    Level of consciousness:  within normal limits   Appearance:  fair grooming and fair hygiene  Behavior/Motor:  no abnormalities noted  Attitude toward examiner:  evasive  Speech:  spontaneous, normal rate and normal volume   Mood: Patient reports \"fine \"; presents depressed  Affect: Incongruent, blunted  Thought processes:  linear and coherent   Thought content:  Homicidal ideation - none  Suicidal Ideation:  denies suicidal ideation  Delusions: Patient appears to have unrealistic expectations regarding his ability to enter the [employer] and his reasoning for inpatient hospitalization as a bridge to that desire as it relates to suicidal ideation and medication compliance  Perceptual Disturbance:  denies any perceptual disturbance  Cognition:  oriented to person, place, and time   Concentration intact  Insight poor   Judgement poor     ASSESSMENT:   Patient symptoms are:  [] Well controlled  [] Improving  [] Worsening  [x] No change      Diagnosis:   Principal Problem:    Depression with suicidal ideation  Resolved Problems:    * No resolved hospital problems. *      LABS:    Recent Labs     08/16/21  0802   WBC 8.9   HGB 12.9*        Recent Labs     08/16/21  0802      K 4.2      CO2 26   BUN 12   CREATININE 0.90   GLUCOSE 84     Recent Labs     08/16/21  0802   BILITOT 0.55   ALKPHOS 86   AST 31   ALT 22     No results found for: [address], BARBSCNU, LABBENZ, CANNAB, COCAINESCRN, LABMETH, OPIATESCREENURINE, PHENCYCLIDINESCREENURINE, PPXUR, ETOH  Lab Results   Component Value Date    TSH 0.26 04/11/2020     No results found for: LITHIUM  No results found for: VALPROATE, CBMZ        Treatment Plan:  Continue to encourage medication compliance    Reviewed current Medications with the patient. Risks, benefits, side effects, drug-to-drug interactions and alternatives to treatment were discussed. The patient  consented to treatment. Encourage patient to attend group and other milieu activities. Discharge planning discussed with the patient and treatment team.    PSYCHOTHERAPY/COUNSELING:  [] Therapeutic interview  [x] Supportive  [] CBT  [] Ongoing  [] Other    [x] Patient continues to need, on a daily basis, active treatment furnished directly by or requiring the supervision of inpatient psychiatric personnel      Anticipated Length of stay per MD Eva Rizo is a 22 y.o. male being evaluated face to face.      --ROYAL Desir - CNP on 8/17/2021 at 11:07 AM    An electronic signature was used to authenticate this note. **This report has been created using voice recognition software. It may contain minor errors which are inherent in voice recognition technology. **                                         Psychiatry Attending Attestation     I independently saw and evaluated the patient. I reviewed the Advance Practice Provider's documentation above. Any additional comments or changes to the Advance Practice Provider's documentation are stated below otherwise agree with assessment. Patient laying in bed, withdrawn. Reports his mood is up and down. Patient is irritable off and on. Reports concentration is poor. Patient feels helpless ,hopless and worthless. Patient is oriented to time place and person. Denies any hallucinations or delusions. Sleep is fluctuating, appetite is poor. Patient feels down depressed nervous and anxious. Patient has marked anticipatory anxiety that was explored during the session, support was given and clarification done. Patient refused to take his mirtazapine last night. Continues to be worried that he will not be able to attend [employer] or [employer] if he is on antidepressant medication. Educated him again about this. Side-effect of medication were discussed with the patient. Case was discussed with the  and with the staff. Session was supportive.        Electronically signed by Regino Aase, MD on 8/17/21 at 4:34 PM EDT

## 2021-08-17 NOTE — GROUP NOTE
Group Therapy Note    Date: 8/17/2021    Group Start Time: 1100  Group End Time: 0236  Group Topic: Cognitive Skills    BALDO Troy, CTRS    Pt did not attend 1100 cognitive skills group d/t resting in room despite staff invitation to attend. 1:1 talk time offered as alternative to group session, pt declined.               Signature:  Shawanda Vega

## 2021-08-18 PROCEDURE — 99231 SBSQ HOSP IP/OBS SF/LOW 25: CPT | Performed by: PSYCHIATRY & NEUROLOGY

## 2021-08-18 PROCEDURE — APPSS30 APP SPLIT SHARED TIME 16-30 MINUTES: Performed by: PSYCHIATRY & NEUROLOGY

## 2021-08-18 PROCEDURE — 1240000000 HC EMOTIONAL WELLNESS R&B

## 2021-08-18 NOTE — GROUP NOTE
Group Therapy Note    Date: 8/18/2021    Group Start Time: 1000  Group End Time: 2067  Group Topic: Psychotherapy    ALIZA Heller LSW        Group Therapy Note    Attendees: 7/19           Patient was offered group therapy today but declined to participate despite encouragement from staff. 1:1 was offered.   Signature:  ALIZA Saldana LSW

## 2021-08-18 NOTE — PROGRESS NOTES
BEHAVIORAL HEALTH FOLLOW-UP NOTE     8/18/2021     Patient was seen and examined in person, Chart reviewed   Patient's case discussed with staff/team   Patient attending groups: No   Patient compliant with medication: Declines offered medications    Chief Complaint: Depression with suicidal ideation    Interim History:   Nursing staff reports that patient continues to comes to [facility] for personal needs and nutrition only. He has declined prescribed medication and during assessment offers again that he has no intention of taking medication but is seeking a medical clearance so he may join the [employer]. Mr. Clara Zhao is pleasant and responds to assessment questions appropriately however has minimal insight into the seriousness of his hospitalization and reported suicidal ideation. He reports poor appetite as relates to nutritional choices. He shares that his sleep was fragmented however he naps throughout the day. He is encouraged to consider attending groups or milieu programming and he shows moderate interest.  Patient denies suicidal ideation, and contracts for safety on this unit. He is vague when asked about his ability to function in the community if he were not accepted into the [employer]. He does share some concerns regarding new employment that is anticipated for tomorrow as he has a new apartment of 3 months and is concerned regarding the ability to pay rent. It is noted later in the day that he is sitting on the [facility] and engaging with peers. At this time our team will continue to work to establish therapeutic alliance.     Appetite:   [] Normal/Unchanged  [] Increased  [] Decreased      Sleep:       [x] Normal/Unchanged  [] Fair       [] Poor              Energy:    [] Normal/Unchanged  [] Increased  [x] Decreased        Aggression:  [] yes  [x] no    Patient is [x] able  [] unable to CONTRACT FOR SAFETY ON THE UNIT    PAST MEDICAL/PSYCHIATRIC HISTORY:   Past Medical History:   Diagnosis Date    ADHD (attention deficit hyperactivity disorder) 2005    ON RX    Fracture 08/2014    LT WRIST    Gestation period, 40 weeks     VANGINAL BIRTH, BIRTH WT 8LB NO COMPLICATIONS    Guardianship     GRANDMOTHER-JUAN IS LEGAL GUARDIAN, MOTHER HAS VISITATION BUT WILL NOT BE PRESENT DAY OF SURGER       FAMILY/SOCIAL HISTORY:  Family History   Problem Relation Age of Onset    Diabetes Paternal Grandmother      Social History     Socioeconomic History    Marital status: Single     Spouse name: Not on file    Number of children: Not on file    Years of education: Not on file    Highest education level: Not on file   Occupational History    Not on file   Tobacco Use    Smoking status: Passive Smoke Exposure - Never Smoker    Smokeless tobacco: Never Used   Vaping Use    Vaping Use: Some days   Substance and Sexual Activity    Alcohol use: No    Drug use: Yes     Types: Marijuana    Sexual activity: Not on file   Other Topics Concern    Not on file   Social History Narrative    Not on file     Social Determinants of Health     Financial Resource Strain:     Difficulty of Paying Living Expenses:    Food Insecurity:     Worried About Running Out of Food in the Last Year:     Ran Out of Food in the Last Year:    Transportation Needs:     Lack of Transportation (Medical):      Lack of Transportation (Non-Medical):    Physical Activity:     Days of Exercise per Week:     Minutes of Exercise per Session:    Stress:     Feeling of Stress :    Social Connections:     Frequency of Communication with Friends and Family:     Frequency of Social Gatherings with Friends and Family:     Attends Orthodox Services:     Active Member of Clubs or Organizations:     Attends Club or Organization Meetings:     Marital Status:    Intimate Partner Violence:     Fear of Current or Ex-Partner:     Emotionally Abused:     Physically Abused:     Sexually Abused:            ROS:  [x] All negative/unchanged except if checked.  Explain positive(checked items) below:  [] Constitutional  [] Eyes  [] Ear/Nose/Mouth/Throat  [] Respiratory  [] CV  [] GI  []   [] Musculoskeletal  [] Skin/Breast  [] Neurological  [] Endocrine  [] Heme/Lymph  [] Allergic/Immunologic    Explanation:     MEDICATIONS:    Current Facility-Administered Medications:     mirtazapine (REMERON) tablet 7.5 mg, 7.5 mg, Oral, Nightly, Marny Pert, APRN - CNP    acetaminophen (TYLENOL) tablet 650 mg, 650 mg, Oral, Q4H PRN, Mikaela Ames MD    aluminum & magnesium hydroxide-simethicone (MAALOX) 200-200-20 MG/5ML suspension 30 mL, 30 mL, Oral, Q6H PRN, Mikaela Ames MD    hydrOXYzine (ATARAX) tablet 50 mg, 50 mg, Oral, TID PRN, Mikaela Ames MD    ibuprofen (ADVIL;MOTRIN) tablet 400 mg, 400 mg, Oral, Q6H PRN, Mikaela Ames MD    traZODone (DESYREL) tablet 50 mg, 50 mg, Oral, Nightly PRN, Mikaela Ames MD    polyethylene glycol (GLYCOLAX) packet 17 g, 17 g, Oral, Daily PRN, Mikaela mAes MD    nicotine polacrilex (NICORETTE) gum 2 mg, 2 mg, Oral, Q1H PRN, Mikaela Ames MD    haloperidol (HALDOL) tablet 5 mg, 5 mg, Oral, Q4H PRN **AND** LORazepam (ATIVAN) tablet 2 mg, 2 mg, Oral, Q4H PRN, Mikaela Ames MD    haloperidol lactate (HALDOL) injection 5 mg, 5 mg, Intramuscular, Q4H PRN **AND** LORazepam (ATIVAN) injection 2 mg, 2 mg, Intramuscular, Q4H PRN **AND** diphenhydrAMINE (BENADRYL) injection 50 mg, 50 mg, Intramuscular, Q4H PRN, Mikaela Ames MD      Examination:  BP 99/61   Pulse 61   Temp 97.8 °F (36.6 °C) (Oral)   Resp 14   Ht 5' 11\" (1.803 m)   Wt 180 lb (81.6 kg)   SpO2 98%   BMI 25.10 kg/m²   Gait - steady  Medication side effects(SE): None applicable as patient is currently declining medications    Mental Status Examination:    Level of consciousness:  within normal limits   Appearance:  fair grooming and fair hygiene  Behavior/Motor:  Calm, no psychomotor abnormalities noted  Attitude toward examiner:  Evasive,  Speech:  spontaneous, normal rate and normal volume   Mood: Patient reports \"ok \"; presents depressed  Affect: Incongruent, blunted  Thought processes:  linear and coherent   Thought content:  Homicidal ideation - none  Suicidal Ideation:  denies suicidal ideation  Delusions: Patient continues to have unrealistic expectations regarding his ability to enter the [employer] and his reasoning for inpatient hospitalization as a bridge to that desire as it relates to suicidal ideation and medication compliance  Perceptual Disturbance:  denies any perceptual disturbance  Cognition:  oriented to person, place, and time   Concentration intact  Insight poor   Judgement poor     ASSESSMENT:   Patient symptoms are:  [] Well controlled  [] Improving  [] Worsening  [x] No change      Diagnosis:   Principal Problem:    Depression with suicidal ideation  Resolved Problems:    * No resolved hospital problems. *      LABS:    Recent Labs     08/16/21  0802   WBC 8.9   HGB 12.9*        Recent Labs     08/16/21  0802      K 4.2      CO2 26   BUN 12   CREATININE 0.90   GLUCOSE 84     Recent Labs     08/16/21  0802   BILITOT 0.55   ALKPHOS 86   AST 31   ALT 22     No results found for: [address], BARBSCNU, LABBENZ, CANNAB, COCAINESCRN, LABMETH, OPIATESCREENURINE, PHENCYCLIDINESCREENURINE, PPXUR, ETOH  Lab Results   Component Value Date    TSH 0.26 04/11/2020     No results found for: LITHIUM  No results found for: VALPROATE, CBMZ        Treatment Plan:  Continue to encourage medication compliance  Continue with current medication regimen and encourage compliance  Reviewed current Medications with the patient. Risks, benefits, side effects, drug-to-drug interactions and alternatives to treatment were discussed. The patient  consented to treatment. Encourage patient to attend group and other milieu activities.   Discharge planning discussed with the

## 2021-08-18 NOTE — PROGRESS NOTES
Med refusal:  Refused Remeron stating he doesn't need med's. No +response to encouragement or med education. TREATMENT PLAN (Medication Management Only)      [facility]    Name and Date of Birth:  Santa Pedraza 13 y o  2001  Date of Treatment Plan: July 18, 2019  Diagnosis/Diagnoses:    1  Anxiety disorder, unspecified type    2  PTSD (post-traumatic stress disorder)      Strengths/Personal Resources for Self-Care: Basketball, sports  Area/Areas of need (in own words): Communication  1  Long Term Goal: continue improvement in anxiety  Target Date: 1 year - 7/18/2020  Person/Persons responsible for completion of goal: Freddy Sierra PA-C  2  Short Term Objective (s) - How will we reach this goal?:   A  Provider new recommended medication/dosage changes and/or continue medication(s): Refer for outpatient therapy  B   May initiate medication if symptoms worsen in severity  C   N/A  Target Date: 1 month - 8/18/2019  Person/Persons Responsible for Completion of Goal: Freddy Sierra PA-C  Progress Towards Goals: starting treatment  Treatment Modality: Refer for outpatient therapy  Review due 90 to 120 days from date of this plan: 3 months - 10/18/2019  Expected length of service: maintenance    My Physician/PA/NP and I have developed this plan together and I agree to work on the goals and objectives  I understand the treatment goals that were developed for my treatment        Signature:        Date and time:        Signature of parent/guardian if under age of 15 years:  Date and time:        Signature of provider:       Date and time: 7/18/2019    Jessy Sierra PA-C        Signature of Supervising Physician:     Date and time:

## 2021-08-18 NOTE — GROUP NOTE
Group Therapy Note    Date: 8/18/2021    Group Start Time: 0900  Group End Time: 5755  Group Topic: Community Meeting    [facility] SILVERIO Good        Group Therapy Note    Pt did not attend community meeting group d/t resting in room despite staff invitation to attend. 1:1 talk time offered as alternative to group session, pt declined.

## 2021-08-18 NOTE — PLAN OF CARE
Problem: Suicide risk  Goal: Provide patient with safe environment  Description: Provide patient with safe environment  8/17/2021 2033 by Karoline Crow RN  Outcome: Ongoing   Safe environment provided. Problem: Altered Mood, Depressive Behavior:  Goal: Ability to disclose and discuss suicidal ideas will improve  Description: Ability to disclose and discuss suicidal ideas will improve  8/17/2021 2033 by Karoline Crow RN  Outcome: Ongoing   Denies suicidal ideation.

## 2021-08-18 NOTE — GROUP NOTE
Group Therapy Note    Date: 8/18/2021    Group Start Time: 1100  Group End Time: 2542  Group Topic: Recreational    STOTILIO Berumen        Group Therapy Note    Pt did not attend recreational conversation group d/t resting in room despite staff invitation to attend. 1:1 talk time offered as alternative to group session, pt declined.

## 2021-08-18 NOTE — PLAN OF CARE
Problem: Altered Mood, Depressive Behavior:  Goal: Able to verbalize acceptance of life and situations over which he or she has no control  Description: Able to verbalize acceptance of life and situations over which he or she has no control  Outcome: Ongoing   1:1 with pt x ten minutes. Pt encouraged to attend unit programming and interact with peers and staff. Pt also encouraged to tend to hygiene and ADLs. Pt encouraged to discuss feelings with staff and feedback and reassurance provided. Pt denies thoughts of self harm and is agreeable to seeking out should thoughts of self harm arise. Safe environment maintained. Q15 minute checks for safety cont per unit policy. Will cont to monitor for safety and provides support and reassurance as needed. Pt is seclusive to room for long intervals. States he is ready to be discharged.

## 2021-08-19 VITALS
HEART RATE: 58 BPM | SYSTOLIC BLOOD PRESSURE: 103 MMHG | RESPIRATION RATE: 14 BRPM | WEIGHT: 180 LBS | BODY MASS INDEX: 25.2 KG/M2 | DIASTOLIC BLOOD PRESSURE: 63 MMHG | OXYGEN SATURATION: 98 % | TEMPERATURE: 98 F | HEIGHT: 71 IN

## 2021-08-19 PROCEDURE — 99239 HOSP IP/OBS DSCHRG MGMT >30: CPT | Performed by: PSYCHIATRY & NEUROLOGY

## 2021-08-19 RX ORDER — MIRTAZAPINE 7.5 MG/1
7.5 TABLET, FILM COATED ORAL NIGHTLY
Qty: 30 TABLET | Refills: 0 | Status: SHIPPED | OUTPATIENT
Start: 2021-08-19 | End: 2022-04-22

## 2021-08-19 NOTE — SUICIDE SAFETY PLAN
SAFETY PLAN     A suicide Safety Plan is a document that supports someone when they are having thoughts of suicide.     Warning Signs that indicate a suicidal crisis may be developing: What (situations, thoughts, feelings, body sensations, behaviors, etc.) do you experience that lets you know you are beginning to think about suicide? 1. Go off medications  2. Mood is depressed and start to feel sad, hopeless, helpless, guilty, decline in self-esteem, excess worry, no interest in doing any pleasurable activities, unable to concentrate  3. Begin to cry over the smallest of things  4. Not eating or sleeping as normal  5. Relationship issues start happening  6. I become angry and start a fight  7. When I dont listen or respond to people in a good, positive way  8. Increase drug use       Internal Coping Strategies:  What things can I do (relaxation techniques, hobbies, physical activities, etc.) to take my mind off my problems without contacting another person? 1. Go to hospital discharge appointments and follow-up with community mental health counseling  2. Talk with other people  3. Learn to identify and control your emotions by new ways  4. Think before you speak or act; walk away from the situation  5. Join a support group in person or on Social Media  6. Take a time-out  7. Take deep breaths; use relaxation techniques  8. Get some exercise; go for a walk  9. Read; listen to music; watch a funny movie    10. Coping skills/ strategies  journal/ listen to music/ go for a walk/ read a book/ watch a funny movie/show / crafts / video game   11.  Grounding techniques- eat a sour candy or hot cinnamon candy / focus on colors, sounds, smells, textures on things around you / drink some herbal tea / eat a piece of dark chocolate / take a hot bath or shower / essential oils for smelling / meditate / color / arts and crafts     People whom I can ask for help: Who can I call when I need help - for example, friends, family, clergy, someone else? 1. Parents  2. Friends  3. Follow up facility     Professionals or [facility] [address] I can contact during a crisis: Who can I call for help - for example, my doctor, my psychiatrist, my psychologist, a mental health provider, a suicide hotline? 1. Staff at follow up care  2. Suicide Prevention Lifeline: 2-379-505-TALK (7548)  3. [facility] Crisis [facility] Team, face-to-face services, call [phone] (7517)  4. [employer]: 2-1-1, 932.355.3552 or 6-192.568.7628  5. [employer] (Crisis Intervention Team - CIT), [phone] or 9-1-1  6. [address], [phone]  7. National Association of Mental Illness, 5-794.835.7119  8. [facility] Substance Abuse National Helpline, 0-929-702-HELP (2294)  9. Crisis Text Line, Text 4HOPE to 705005 to connect with a crisis counselor  10. [facility], 8-459.147.8686  11. [employer] (Rape, [address]), 0-126.129.5091  09. [employer] (Alcohol / Drug help)  13. Call the Recovery Helpline at 816.358.9354 (24 hours a day - 7 days a week)  14. COVID-19 Emotional Support Line: [address] [facility] - for example, [address], [facility] suicide hotline,   1. [facility] [address], 6-642.231.1454  2. [place] line at 550-237-CARE (5840) for 24/7 to help anyone having a mental crisis or thoughts of self-harm.  The Crisis CARE number will also determine in a face-to-face screening needs to be done as well as the safest place.  Once this is determined, the Crisis CARE Mobile Outreach Team will be sent out to meet with the patient directly if required. 3. For [facility]  Crisis Number [phone] ([address] [facility])  4. [facility] [place] at [phone]  5. Melinda [address] at 8-595.673.4933  6.  [facility] [phone]           7. Sukhi Rodrigues, [place], [place], [place], [place], [place], [place] - [phone]  8. Aubree Levan, [address] [place] [address], [address] [phone]  9. Jass Wong, [place], [place], [address] [facility] - 3-082-096-HOPE ([phone])        Making the environment safe: How can I make my environment (house/apartment/living space) safer? For example, can I remove guns, medications, and other items? 1. Remove unsafe objects  2. Keep Medications in safe and secure location  3.  Plan daily goals to help remember to stay on specific medications

## 2021-08-19 NOTE — GROUP NOTE
Group Therapy Note    Date: 8/19/2021    Group Start Time: 1000  Group End Time: 4817  Group Topic: Psychotherapy    [facility] ALIZA Acuña, ANDRE        Group Therapy Note    Attendees: 5/18         Patient was offered group therapy today but declined to participate despite encouragement from staff. 1:1 was offered.     Signature:  ALIZA Waterman, ANDRE

## 2021-08-19 NOTE — BH NOTE
Pt. Declined to attend 1430 group. Pt offered 1:1 talk time as an alternative to group. Pt. Declined.

## 2021-08-19 NOTE — GROUP NOTE
Group Therapy Note    Date: 8/19/2021    Group Start Time: 1100  Group End Time: 2924  Group Topic: Recreational    STOTILIO NICHOLSON    Claudette Alcon        Group Therapy Note    Attendees: 7/18       Patient's Goal:  Patients engage in game grou with goals to increase socialization, engage in leisure interests, normalize the environment, and increase sen of community    Notes:  Patient attended and participated in group having positive interactions with peers and staff. Status After Intervention:  Improved    Participation Level:  Active Listener and Interactive    Participation Quality: Appropriate, Attentive and Sharing      Speech:  normal      Thought Process/Content: Logical  Linear      Affective Functioning: Congruent      Mood: euthymic      Level of consciousness:  Alert and Attentive      Response to Learning: Able to verbalize current knowledge/experience, Able to verbalize/acknowledge new learning and Progressing to goal      Endings: None Reported    Modes of Intervention: Socialization, Exploration, Activity and Reality-testing      Discipline Responsible: Psychoeducational Specialist      Signature:  Claudette Alcon
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
## 2021-08-19 NOTE — GROUP NOTE
Group Therapy Note    Date: 8/19/2021    Group Start Time: 0900  Group End Time: 0915  Group Topic: Community Meeting    BALDO Callahan        Group Therapy Note    Pt did not attend community meeting group d/t resting in room despite staff invitation to attend. 1:1 talk time offered as alternative to group session, pt declined.

## 2021-08-19 NOTE — SUICIDE SAFETY PLAN
SAFETY PLAN     A suicide Safety Plan is a document that supports someone when they are having thoughts of suicide.     Warning Signs that indicate a suicidal crisis may be developing: What (situations, thoughts, feelings, body sensations, behaviors, etc.) do you experience that lets you know you are beginning to think about suicide? 1. Go off medications  2. Mood is depressed and start to feel sad, hopeless, helpless, guilty, decline in self-esteem, excess worry, no interest in doing any pleasurable activities, unable to concentrate  3. Begin to cry over the smallest of things  4. Not eating or sleeping as normal  5. Relationship issues start happening  6. I become angry and start a fight  7. When I dont listen or respond to people in a good, positive way  8. Increase drug use       Internal Coping Strategies:  What things can I do (relaxation techniques, hobbies, physical activities, etc.) to take my mind off my problems without contacting another person? 1. Go to hospital discharge appointments and follow-up with community mental health counseling  2. Talk with other people  3. Learn to identify and control your emotions by new ways  4. Think before you speak or act; walk away from the situation  5. Join a support group in person or on Social Media  6. Take a time-out  7. Take deep breaths; use relaxation techniques  8. Get some exercise; go for a walk  9. Read; listen to music; watch a funny movie    10. Coping skills/ strategies  journal/ listen to music/ go for a walk/ read a book/ watch a funny movie/show / crafts / video game   11.  Grounding techniques- eat a sour candy or hot cinnamon candy / focus on colors, sounds, smells, textures on things around you / drink some herbal tea / eat a piece of dark chocolate / take a hot bath or shower / essential oils for smelling / meditate / color / arts and crafts     People whom I can ask for help: Who can I call when I need help - for example, friends, family, clergy, someone else? 1.   2.   3.      Professionals or Behavioral Health agencies I can contact during a crisis: Who can I call for help - for example, my doctor, my psychiatrist, my psychologist, a mental health provider, a suicide hotline? 1. Staff at [facility]  2. Suicide Prevention Lifeline: 6-070-688-OGWL (3847)  3. [facility] Crisis [facility] Team, face-to-face services, call [phone] (4473)  4. [facility] [employer]: 2-1-1, 401.551.5143 or 3-335.528.1172  5. [employer] (Crisis Intervention Team - CIT), 769.702.6654 or 9-1-1  6. [address] [place], 9-287.517.8274  7. National Association of Mental Illness, 5-747.928.4347  8. [facility] Substance Abuse National Helpline, 1-211-618-HELP (2456)  9. Crisis Text Line, Text 4HOPE to 689551 to connect with a crisis counselor  10. [address] [employer] [place], 8-289.850.4894  11. [employer] (Rape, [address]), 0-557.964.7359  19. [employer]icacatreatment. com (Alcohol / Drug help)  13. Call the Recovery Helpline at 883.941.5132 (24 hours a day - 7 days a week)  14. COVID-19 Emotional Support Line: [address] [facility] - for example, [address], [facility] suicide hotline,   1. [facility], 6-356-883-570.262.6326  2. [employer] line at 137-435-CARE (6604) for 24/7 to help anyone having a mental crisis or thoughts of self-harm.  The Crisis CARE number will also determine in a face-to-face screening needs to be done as well as the safest place.  Once this is determined, the [address] [place] [address] Team will be sent out to meet with the patient directly if required. 3. For [facility]  Crisis Number [phone] ([address])  4. [facility] at 5-394.776.1316  5. Gregg Felder, [address] [place] [address] at [phone]  6. [facility] 1-120.540.6968           7.  [address] [place], Cyndee Martinez, [facility] - [phone]  8. Anjali José, [address] [place] [address], [address] [phone]  9. Jass Nugent, [place], [address] [facility] 8-370-447-HOPE (2013)        Making the environment safe: How can I make my environment (house/apartment/living space) safer? For example, can I remove guns, medications, and other items? 1. Remove unsafe objects  2. Keep Medications in safe and secure location  3.  Plan daily goals to help remember to stay on specific medications

## 2021-08-19 NOTE — BH NOTE
[facility]  Discharge Note    Pt discharged with followings belongings:   Dentures: None  Vision - Corrective Lenses: None  Hearing Aid: None  Jewelry: None  Body Piercings Removed: N/A  Clothing: Footwear, Pants, Shirt, Socks  Were All Patient Medications Collected?: Not Applicable  Other Valuables: Cell phone, [address] sent home with N/A or returned to patient. Patient education on aftercare instructions: Yes. Information faxed to [facility] by RN at 4:10 PM .Patient verbalize understanding of AVS: Yes  Status EXAM upon discharge:  Status and Exam  Normal: Yes  Facial Expression: Brightened  Affect: Appropriate  Level of Consciousness: Alert  Mood:Normal: No  Mood: Depressed  Motor Activity:Normal: No  Motor Activity: Decreased  Interview Behavior: Cooperative, Evasive  Preception: [place] to Person, [place]urnell Merle to Time, [place] to Place, [place] to Situation  Attention:Normal: Yes  Attention: Distractible  Thought Processes: Circumstantial  Thought Content:Normal: Yes  Thought Content: Preoccupations  Hallucinations: None  Delusions: No  Memory:Normal: Yes  Insight and Judgment: No  Insight and Judgment: Poor Insight, Unmotivated  Present Suicidal Ideation: No  Present Homicidal Ideation: No      Metabolic Screening:    Lab Results   Component Value Date    LABA1C 5.6 04/11/2020       Lab Results   Component Value Date    CHOL 132 04/11/2020     Lab Results   Component Value Date    TRIG 51 04/11/2020     Lab Results   Component Value Date    HDL 37 (L) 04/11/2020     No components found for: LDLCAL  No results found for: LABVLDL    Suzette Finnegan RN    Patient discharged stable to home at [phone] with all belongings via personal ride.

## 2021-08-19 NOTE — PLAN OF CARE
Problem: Suicide risk  Goal: Provide patient with safe environment  Description: Provide patient with safe environment  8/18/2021 2213 by Faisal Smith RN  Outcome: Ongoing   Safe environment provided. Problem: Altered Mood, Depressive Behavior:  Goal: Able to verbalize and/or display a decrease in depressive symptoms  Description: Able to verbalize and/or display a decrease in depressive symptoms  8/18/2021 2213 by Faisal Smith RN  Outcome: Ongoing   Denies being depressed.

## 2022-03-03 ENCOUNTER — APPOINTMENT (OUTPATIENT)
Dept: GENERAL RADIOLOGY | Age: 22
End: 2022-03-03
Payer: MEDICAID

## 2022-03-03 ENCOUNTER — HOSPITAL ENCOUNTER (EMERGENCY)
Age: 22
Discharge: HOME | End: 2022-03-04
Attending: EMERGENCY MEDICINE
Payer: MEDICAID

## 2022-03-03 VITALS
SYSTOLIC BLOOD PRESSURE: 141 MMHG | BODY MASS INDEX: 24.38 KG/M2 | WEIGHT: 180 LBS | HEART RATE: 109 BPM | TEMPERATURE: 98.5 F | HEIGHT: 72 IN | DIASTOLIC BLOOD PRESSURE: 84 MMHG | OXYGEN SATURATION: 96 % | RESPIRATION RATE: 16 BRPM

## 2022-03-03 DIAGNOSIS — M25.522 LEFT ELBOW PAIN: ICD-10-CM

## 2022-03-03 DIAGNOSIS — S09.90XA TRAUMATIC INJURY OF HEAD, INITIAL ENCOUNTER: ICD-10-CM
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
## 2022-03-03 PROCEDURE — 73080 X-RAY EXAM OF ELBOW: CPT

## 2022-03-03 PROCEDURE — 73090 X-RAY EXAM OF FOREARM: CPT

## 2022-03-03 PROCEDURE — 87635 SARS-COV-2 COVID-19 AMP PRB: CPT

## 2022-03-03 PROCEDURE — 99283 EMERGENCY DEPT VISIT LOW MDM: CPT

## 2022-03-03 PROCEDURE — 6370000000 HC RX 637 (ALT 250 FOR IP): Performed by: STUDENT IN AN ORGANIZED HEALTH CARE EDUCATION/TRAINING PROGRAM

## 2022-03-03 RX ORDER — ACETAMINOPHEN 500 MG
1000 TABLET ORAL ONCE
Status: COMPLETED | OUTPATIENT
Start: 2022-03-03 | End: 2022-03-03

## 2022-03-03 RX ORDER — IBUPROFEN 800 MG/1
800 TABLET ORAL ONCE
Status: COMPLETED | OUTPATIENT
Start: 2022-03-03 | End: 2022-03-03

## 2022-03-03 RX ADMIN — IBUPROFEN 800 MG: 800 TABLET, FILM COATED ORAL at 22:01

## 2022-03-03 RX ADMIN — ACETAMINOPHEN 1000 MG: 500 TABLET ORAL at 22:01

## 2022-03-03 ASSESSMENT — ENCOUNTER SYMPTOMS
NAUSEA: 0
RHINORRHEA: 0
SHORTNESS OF BREATH: 0
VOMITING: 0
DIARRHEA: 0
BACK PAIN: 0
CONSTIPATION: 0
COUGH: 0
ABDOMINAL PAIN: 0

## 2022-03-03 ASSESSMENT — PAIN SCALES - GENERAL: PAINLEVEL_OUTOF10: 6

## 2022-03-04 ENCOUNTER — APPOINTMENT (OUTPATIENT)
Dept: CT IMAGING | Age: 22
End: 2022-03-04
Payer: MEDICAID

## 2022-03-04 LAB
SARS-COV-2, RAPID: NOT DETECTED
SPECIMEN DESCRIPTION: NORMAL

## 2022-03-04 PROCEDURE — 70450 CT HEAD/BRAIN W/O DYE: CPT

## 2022-03-04 PROCEDURE — 70486 CT MAXILLOFACIAL W/O DYE: CPT
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
## 2022-03-04 NOTE — ED NOTES
Sw arranged transportation for hospital discharge. [facility] [address].  Trip # [id number], LSW  03/04/22 6500
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
## 2022-04-22 ENCOUNTER — HOSPITAL ENCOUNTER (EMERGENCY)
Age: 22
Discharge: HOME | End: 2022-04-22
Attending: SPECIALIST
Payer: MEDICAID

## 2022-04-22 VITALS
HEIGHT: 72 IN | RESPIRATION RATE: 17 BRPM | HEART RATE: 75 BPM | WEIGHT: 180 LBS | TEMPERATURE: 98.6 F | OXYGEN SATURATION: 97 % | SYSTOLIC BLOOD PRESSURE: 130 MMHG | BODY MASS INDEX: 24.38 KG/M2 | DIASTOLIC BLOOD PRESSURE: 82 MMHG

## 2022-04-22 DIAGNOSIS — A64 STD (MALE): Primary | ICD-10-CM

## 2022-04-22 PROCEDURE — 6360000002 HC RX W HCPCS: Performed by: SPECIALIST

## 2022-04-22 PROCEDURE — 87591 N.GONORRHOEAE DNA AMP PROB: CPT

## 2022-04-22 PROCEDURE — 6370000000 HC RX 637 (ALT 250 FOR IP): Performed by: SPECIALIST

## 2022-04-22 PROCEDURE — 96372 THER/PROPH/DIAG INJ SC/IM: CPT

## 2022-04-22 PROCEDURE — 87491 CHLMYD TRACH DNA AMP PROBE: CPT

## 2022-04-22 PROCEDURE — 99283 EMERGENCY DEPT VISIT LOW MDM: CPT

## 2022-04-22 RX ORDER — DOXYCYCLINE 100 MG/1
100 TABLET ORAL 2 TIMES DAILY
Qty: 14 TABLET | Refills: 0 | Status: SHIPPED | OUTPATIENT
Start: 2022-04-22 | End: 2022-04-29

## 2022-04-22 RX ORDER — CEFTRIAXONE 500 MG/1
500 INJECTION, POWDER, FOR SOLUTION INTRAMUSCULAR; INTRAVENOUS ONCE
Status: COMPLETED | OUTPATIENT
Start: 2022-04-22 | End: 2022-04-22

## 2022-04-22 RX ORDER — DOXYCYCLINE HYCLATE 100 MG
100 TABLET ORAL ONCE
Status: COMPLETED | OUTPATIENT
Start: 2022-04-22 | End: 2022-04-22

## 2022-04-22 RX ADMIN — DOXYCYCLINE HYCLATE 100 MG: 100 TABLET, COATED ORAL at 22:11

## 2022-04-22 RX ADMIN — CEFTRIAXONE SODIUM 500 MG: 500 INJECTION, POWDER, FOR SOLUTION INTRAMUSCULAR; INTRAVENOUS at 22:11

## 2022-04-22 ASSESSMENT — PAIN - FUNCTIONAL ASSESSMENT: PAIN_FUNCTIONAL_ASSESSMENT: NONE - DENIES PAIN

## 2022-04-23 NOTE — ED PROVIDER NOTES
[facility] ENCOUNTER      Pt Name: Ramana Price  MRN: [id number]  Deep[place]fsav 2000  Date of evaluation: 4/23/22      CHIEF COMPLAINT       Chief Complaint   Patient presents with    Penile Discharge     states he is having penile discharge          HISTORY OF PRESENT ILLNESS    Ramana Price is a 25 y.o. male who presents to the emergency department for evaluation of sexually transmitted disease as well as treatment for it. Patient has history of chlamydia and gonorrhea infections and January 2022 and gonorrhea infection again in March 2022. He was treated with Rocephin intramuscularly both the times and doxycycline in January for chlamydia. He has had unprotected intercourse about 1 week ago and started having yellowish penile discharge at about 7 PM on April 21, 2022. He denies any fever, chills, abdominal pain, flank pain, nausea or vomiting. He also has history of gonorrhea infection and July 2020. There are no exacerbating or relieving factors. REVIEW OF SYSTEMS       Review of Systems    All systems reviewed and negative unless noted in HPI. The patient denies fever or constitutional symptoms. Denies vision change. Denies any sore throat or rhinorrhea. Denies any neck pain or stiffness. Denies chest pain or shortness of breath. No nausea,  vomiting or diarrhea. Denies musculoskeletal injury or pain. Denies any weakness, numbness or focal neurologic deficit. Denies any skin rash or edema. No recent psychiatric issues. No easy bruising or bleeding. Denies any polyuria, polydypsia       PAST MEDICAL HISTORY    has a past medical history of ADHD (attention deficit hyperactivity disorder), Fracture, Gestation period, 40 weeks, and Guardianship. SURGICAL HISTORY      has a past surgical history that includes Wrist Closed Reduction (Left, 8/22/2014) and Hardware Removal (Left, 10/3/14).     CURRENT MEDICATIONS       Discharge Medication List as of 2022  9:47 PM          ALLERGIES     has No Known Allergies. FAMILY HISTORY     He indicated that his mother is alive. He indicated that his father is alive. He indicated that both of his sisters are alive. He indicated that both of his brothers are alive. He indicated that his maternal grandmother is alive. He indicated that his maternal grandfather is . He indicated that his paternal grandmother is alive. He indicated that his paternal grandfather is alive. family history includes Diabetes in his paternal grandmother. SOCIAL HISTORY      reports that he is a non-smoker but has been exposed to tobacco smoke. He has never used smokeless tobacco. He reports current drug use. Drug: Marijuana Tiffanie Kales). He reports that he does not drink alcohol. PHYSICAL EXAM     INITIAL VITALS:  height is 6' (1.829 m) and weight is 81.6 kg (180 lb). His oral temperature is 98.6 °F (37 °C). His blood pressure is 130/82 and his pulse is 75. His respiration is 17 and oxygen saturation is 97%. Physical Exam  Vitals and nursing note reviewed. Constitutional:       Appearance: He is well-developed. HENT:      Head: Normocephalic and atraumatic. Nose: Nose normal.   Eyes:      Extraocular Movements: Extraocular movements intact. Pupils: Pupils are equal, round, and reactive to light. Cardiovascular:      Rate and Rhythm: Normal rate and regular rhythm. Heart sounds: Normal heart sounds. No murmur heard. Pulmonary:      Effort: Pulmonary effort is normal. No respiratory distress. Breath sounds: Normal breath sounds. Abdominal:      General: Bowel sounds are normal. There is no distension. Palpations: Abdomen is soft. Tenderness: There is no abdominal tenderness. Genitourinary:     Testes:         Right: Tenderness or swelling not present. Left: Tenderness or swelling not present. Musculoskeletal:      Cervical back: Normal range of motion and neck supple. Skin:     General: Skin is warm and dry. Neurological:      General: No focal deficit present. Mental Status: He is alert and oriented to person, place, and time. DIFFERENTIAL DIAGNOSIS/ MDM:     Sexually transmitted disease  Will send urinalysis for chlamydia and gonorrhea cultures  Treat with ceftriaxone and doxycycline    DIAGNOSTIC RESULTS     EKG: All EKG's are interpreted by the Emergency Department Physician who either signs or Co-signs this chart in the absence of a cardiologist.    None obtained    RADIOLOGY:   Interpretation per the Radiologist below, if available at the time of this note:    No results found. LABS:  No results found for this visit on 04/22/22. Urine is sent for chlamydia and gonorrhea cultures    EMERGENCY DEPARTMENT COURSE:   Vitals:    Vitals:    04/22/22 2110   BP: 130/82   Pulse: 75   Resp: 17   Temp: 98.6 °F (37 °C)   TempSrc: Oral   SpO2: 97%   Weight: 81.6 kg (180 lb)   Height: 6' (1.829 m)     -------------------------  BP: 130/82, Temp: 98.6 °F (37 °C), Pulse: 75, Resp: 17    Orders Placed This Encounter   Medications    cefTRIAXone (ROCEPHIN) injection 500 mg     Order Specific Question:   Antimicrobial Indications     Answer:   STD infection    doxycycline hyclate (VIBRA-TABS) tablet 100 mg     Order Specific Question:   Antimicrobial Indications     Answer:   STD infection    doxycycline monohydrate (ADOXA) 100 MG tablet     Sig: Take 1 tablet by mouth 2 times daily for 7 days     Dispense:  14 tablet     Refill:  0         During the ED course, patient was given ceftriaxone 500 mg intramuscularly and doxycycline 100 mg orally. Plan is to discharge the patient on doxycycline for 7 days course. He is advised to avoid sexual intercourse 2 weeks after completion of the treatment and he was encouraged to notify his sexual partner/partners to be evaluated and treated for STI.   He is advised to call us if any questions, concerns or problems. CONSULTS:  None    PROCEDURES:  None    FINAL IMPRESSION      1. STD (male)          DISPOSITION/PLAN       PATIENT REFERRED TO:  Radha Gannon MD  [address] [facility]  [phone]    Call in 2 days  For reevaluation of current symptoms    [facility] ED  [address] [facility] [address] [phone]    If symptoms worsen      DISCHARGE MEDICATIONS:  Discharge Medication List as of 4/22/2022  9:47 PM      START taking these medications    Details   doxycycline monohydrate (ADOXA) 100 MG tablet Take 1 tablet by mouth 2 times daily for 7 days, Disp-14 tablet, R-0Print             (Please note that portions of this note were completed with a voice recognition program.  Efforts were made to edit the dictations but occasionally words are mis-transcribed.)    Luma Raymond MD,, MD, F.A.C.E.P.   Attending Emergency Medicine Physician     Luma Raymond MD  04/23/22 3835

## 2022-04-25 LAB
C. TRACHOMATIS DNA ,URINE: NEGATIVE
N. GONORRHOEAE DNA, URINE: ABNORMAL
SPECIMEN DESCRIPTION: ABNORMAL

## 2022-06-21 ENCOUNTER — HOSPITAL ENCOUNTER (INPATIENT)
Age: 22
LOS: 2 days | Discharge: HOME | DRG: 751 | End: 2022-06-24
Attending: EMERGENCY MEDICINE | Admitting: PSYCHIATRY & NEUROLOGY
Payer: MEDICAID

## 2022-06-21 DIAGNOSIS — F23 ACUTE PSYCHOSIS (HCC): Primary | ICD-10-CM

## 2022-06-21 PROCEDURE — 99285 EMERGENCY DEPT VISIT HI MDM: CPT

## 2022-06-21 PROCEDURE — 96372 THER/PROPH/DIAG INJ SC/IM: CPT

## 2022-06-21 PROCEDURE — 6360000002 HC RX W HCPCS: Performed by: EMERGENCY MEDICINE

## 2022-06-21 RX ORDER — DIPHENHYDRAMINE HYDROCHLORIDE 50 MG/ML
50 INJECTION INTRAMUSCULAR; INTRAVENOUS ONCE
Status: COMPLETED | OUTPATIENT
Start: 2022-06-22 | End: 2022-06-21

## 2022-06-21 RX ORDER — HALOPERIDOL 5 MG/ML
5 INJECTION INTRAMUSCULAR ONCE
Status: COMPLETED | OUTPATIENT
Start: 2022-06-22 | End: 2022-06-21

## 2022-06-21 RX ORDER — 0.9 % SODIUM CHLORIDE 0.9 %
1000 INTRAVENOUS SOLUTION INTRAVENOUS ONCE
Status: DISCONTINUED | OUTPATIENT
Start: 2022-06-22 | End: 2022-06-24 | Stop reason: HOSPADM

## 2022-06-21 RX ORDER — LORAZEPAM 2 MG/ML
2 INJECTION INTRAMUSCULAR ONCE
Status: COMPLETED | OUTPATIENT
Start: 2022-06-22 | End: 2022-06-21

## 2022-06-21 RX ADMIN — DIPHENHYDRAMINE HYDROCHLORIDE 50 MG: 50 INJECTION, SOLUTION INTRAMUSCULAR; INTRAVENOUS at 23:58

## 2022-06-21 RX ADMIN — LORAZEPAM 2 MG: 2 INJECTION, SOLUTION INTRAMUSCULAR; INTRAVENOUS at 23:58

## 2022-06-21 RX ADMIN — HALOPERIDOL 5 MG: 5 INJECTION INTRAMUSCULAR at 23:58

## 2022-06-22 ENCOUNTER — APPOINTMENT (OUTPATIENT)
Dept: CT IMAGING | Age: 22
DRG: 751 | End: 2022-06-22
Payer: MEDICAID

## 2022-06-22 LAB
ABSOLUTE EOS #: 0 K/UL (ref 0–0.4)
ABSOLUTE LYMPH #: 2.2 K/UL (ref 1–4.8)
ABSOLUTE MONO #: 1 K/UL (ref 0.1–1.3)
ACETAMINOPHEN LEVEL: <5 UG/ML (ref 10–30)
ALBUMIN SERPL-MCNC: 5 G/DL (ref 3.5–5.2)
ALP BLD-CCNC: 98 U/L (ref 40–129)
ALT SERPL-CCNC: 14 U/L (ref 5–41)
ANION GAP SERPL CALCULATED.3IONS-SCNC: 16 MMOL/L (ref 9–17)
AST SERPL-CCNC: 29 U/L
BASOPHILS # BLD: 1 % (ref 0–2)
BASOPHILS ABSOLUTE: 0.1 K/UL (ref 0–0.2)
BILIRUB SERPL-MCNC: 0.97 MG/DL (ref 0.3–1.2)
BUN BLDV-MCNC: 17 MG/DL (ref 6–20)
CALCIUM SERPL-MCNC: 10 MG/DL (ref 8.6–10.4)
CHLORIDE BLD-SCNC: 103 MMOL/L (ref 98–107)
CO2: 23 MMOL/L (ref 20–31)
CREAT SERPL-MCNC: 1.06 MG/DL (ref 0.7–1.2)
EOSINOPHILS RELATIVE PERCENT: 0 % (ref 0–4)
ETHANOL PERCENT: <0.01 %
ETHANOL: <10 MG/DL
GFR AFRICAN AMERICAN: >60 ML/MIN
GFR NON-AFRICAN AMERICAN: >60 ML/MIN
GFR SERPL CREATININE-BSD FRML MDRD: NORMAL ML/MIN/{1.73_M2}
GLUCOSE BLD-MCNC: 93 MG/DL (ref 70–99)
HCT VFR BLD CALC: 41.9 % (ref 41–53)
HEMOGLOBIN: 13.6 G/DL (ref 13.5–17.5)
LYMPHOCYTES # BLD: 22 % (ref 25–45)
MCH RBC QN AUTO: 26.9 PG (ref 26–34)
MCHC RBC AUTO-ENTMCNC: 32.5 G/DL (ref 31–37)
MCV RBC AUTO: 82.9 FL (ref 80–100)
MONOCYTES # BLD: 10 % (ref 2–8)
PDW BLD-RTO: 14.5 % (ref 11.5–14.9)
PLATELET # BLD: 320 K/UL (ref 150–450)
PMV BLD AUTO: 6.7 FL (ref 6–12)
POTASSIUM SERPL-SCNC: 3.9 MMOL/L (ref 3.7–5.3)
RBC # BLD: 5.05 M/UL (ref 4.5–5.9)
SALICYLATE LEVEL: <1 MG/DL (ref 3–10)
SEG NEUTROPHILS: 67 % (ref 34–64)
SEGMENTED NEUTROPHILS ABSOLUTE COUNT: 6.8 K/UL (ref 1.3–9.1)
SODIUM BLD-SCNC: 142 MMOL/L (ref 135–144)
TOTAL PROTEIN: 8.2 G/DL (ref 6.4–8.3)
WBC # BLD: 10.1 K/UL (ref 4.5–13.5)

## 2022-06-22 PROCEDURE — 70450 CT HEAD/BRAIN W/O DYE: CPT

## 2022-06-22 PROCEDURE — 80053 COMPREHEN METABOLIC PANEL: CPT

## 2022-06-22 PROCEDURE — 80143 DRUG ASSAY ACETAMINOPHEN: CPT

## 2022-06-22 PROCEDURE — 80179 DRUG ASSAY SALICYLATE: CPT

## 2022-06-22 PROCEDURE — 1240000000 HC EMOTIONAL WELLNESS R&B

## 2022-06-22 PROCEDURE — APPSS60 APP SPLIT SHARED TIME 46-60 MINUTES

## 2022-06-22 PROCEDURE — 99223 1ST HOSP IP/OBS HIGH 75: CPT | Performed by: PSYCHIATRY & NEUROLOGY

## 2022-06-22 PROCEDURE — 93005 ELECTROCARDIOGRAM TRACING: CPT | Performed by: EMERGENCY MEDICINE

## 2022-06-22 PROCEDURE — 99222 1ST HOSP IP/OBS MODERATE 55: CPT | Performed by: INTERNAL MEDICINE

## 2022-06-22 PROCEDURE — 85025 COMPLETE CBC W/AUTO DIFF WBC: CPT

## 2022-06-22 PROCEDURE — 36415 COLL VENOUS BLD VENIPUNCTURE: CPT

## 2022-06-22 PROCEDURE — G0480 DRUG TEST DEF 1-7 CLASSES: HCPCS

## 2022-06-22 RX ORDER — HALOPERIDOL 5 MG
5 TABLET ORAL EVERY 4 HOURS PRN
Status: DISCONTINUED | OUTPATIENT
Start: 2022-06-22 | End: 2022-06-24 | Stop reason: HOSPADM

## 2022-06-22 RX ORDER — HALOPERIDOL 5 MG/ML
5 INJECTION INTRAMUSCULAR EVERY 4 HOURS PRN
Status: DISCONTINUED | OUTPATIENT
Start: 2022-06-22 | End: 2022-06-24 | Stop reason: HOSPADM

## 2022-06-22 RX ORDER — TRAZODONE HYDROCHLORIDE 50 MG/1
50 TABLET ORAL NIGHTLY PRN
Status: DISCONTINUED | OUTPATIENT
Start: 2022-06-22 | End: 2022-06-24 | Stop reason: HOSPADM

## 2022-06-22 RX ORDER — POLYETHYLENE GLYCOL 3350 17 G/17G
17 POWDER, FOR SOLUTION ORAL DAILY PRN
Status: DISCONTINUED | OUTPATIENT
Start: 2022-06-22 | End: 2022-06-24 | Stop reason: HOSPADM

## 2022-06-22 RX ORDER — ACETAMINOPHEN 325 MG/1
650 TABLET ORAL EVERY 4 HOURS PRN
Status: DISCONTINUED | OUTPATIENT
Start: 2022-06-22 | End: 2022-06-24 | Stop reason: HOSPADM

## 2022-06-22 RX ORDER — IBUPROFEN 200 MG
400 TABLET ORAL EVERY 6 HOURS PRN
Status: DISCONTINUED | OUTPATIENT
Start: 2022-06-22 | End: 2022-06-24 | Stop reason: HOSPADM

## 2022-06-22 RX ORDER — LAMOTRIGINE 25 MG/1
25 TABLET ORAL 2 TIMES DAILY
Status: ON HOLD | COMMUNITY
End: 2022-06-24 | Stop reason: HOSPADM

## 2022-06-22 RX ORDER — PALIPERIDONE 3 MG/1
3 TABLET, EXTENDED RELEASE ORAL EVERY MORNING
Status: ON HOLD | COMMUNITY
End: 2022-06-24 | Stop reason: HOSPADM

## 2022-06-22 RX ORDER — DIPHENHYDRAMINE HYDROCHLORIDE 50 MG/ML
50 INJECTION INTRAMUSCULAR; INTRAVENOUS EVERY 4 HOURS PRN
Status: DISCONTINUED | OUTPATIENT
Start: 2022-06-22 | End: 2022-06-24 | Stop reason: HOSPADM

## 2022-06-22 RX ORDER — LORAZEPAM 1 MG/1
2 TABLET ORAL EVERY 4 HOURS PRN
Status: DISCONTINUED | OUTPATIENT
Start: 2022-06-22 | End: 2022-06-24 | Stop reason: HOSPADM

## 2022-06-22 RX ORDER — PALIPERIDONE 3 MG/1
3 TABLET, EXTENDED RELEASE ORAL DAILY
Status: DISCONTINUED | OUTPATIENT
Start: 2022-06-22 | End: 2022-06-24 | Stop reason: HOSPADM

## 2022-06-22 RX ORDER — MAGNESIUM HYDROXIDE/ALUMINUM HYDROXICE/SIMETHICONE 120; 1200; 1200 MG/30ML; MG/30ML; MG/30ML
30 SUSPENSION ORAL EVERY 6 HOURS PRN
Status: DISCONTINUED | OUTPATIENT
Start: 2022-06-22 | End: 2022-06-24 | Stop reason: HOSPADM

## 2022-06-22 RX ORDER — LORAZEPAM 2 MG/ML
2 INJECTION INTRAMUSCULAR EVERY 4 HOURS PRN
Status: DISCONTINUED | OUTPATIENT
Start: 2022-06-22 | End: 2022-06-24 | Stop reason: HOSPADM

## 2022-06-22 RX ORDER — HYDROXYZINE 50 MG/1
50 TABLET, FILM COATED ORAL 3 TIMES DAILY PRN
Status: DISCONTINUED | OUTPATIENT
Start: 2022-06-22 | End: 2022-06-24 | Stop reason: HOSPADM

## 2022-06-22 RX ORDER — NICOTINE 21 MG/24HR
1 PATCH, TRANSDERMAL 24 HOURS TRANSDERMAL DAILY
Status: DISCONTINUED | OUTPATIENT
Start: 2022-06-22 | End: 2022-06-24 | Stop reason: HOSPADM

## 2022-06-22 ASSESSMENT — LIFESTYLE VARIABLES
HOW MANY STANDARD DRINKS CONTAINING ALCOHOL DO YOU HAVE ON A TYPICAL DAY: PATIENT DECLINED
HOW OFTEN DO YOU HAVE A DRINK CONTAINING ALCOHOL: PATIENT DECLINED
HOW OFTEN DO YOU HAVE A DRINK CONTAINING ALCOHOL: PATIENT DECLINED
HOW MANY STANDARD DRINKS CONTAINING ALCOHOL DO YOU HAVE ON A TYPICAL DAY: PATIENT DECLINED

## 2022-06-22 ASSESSMENT — SLEEP AND FATIGUE QUESTIONNAIRES
DO YOU HAVE DIFFICULTY SLEEPING: YES
AVERAGE NUMBER OF SLEEP HOURS: 4
DO YOU USE A SLEEP AID: NO

## 2022-06-22 ASSESSMENT — PATIENT HEALTH QUESTIONNAIRE - PHQ9: SUM OF ALL RESPONSES TO PHQ QUESTIONS 1-9: 18

## 2022-06-22 NOTE — BH NOTE
Patient given tobacco quitline number 08075985755 at this time, refusing to call at this time, states \" I just dont want to quit now\"- patient given information as to the dangers of long term tobacco use. Continue to reinforce the importance of tobacco cessation.

## 2022-06-22 NOTE — ED NOTES
[facility] staff in dept for transport to assigned room.       Tiny Gillis RN  06/22/22 0834
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
## 2022-06-22 NOTE — PROGRESS NOTES
Behavioral Services  Medicare Certification Upon Admission    I certify that this patient's inpatient psychiatric hospital admission is medically necessary for:    [x] (1) Treatment which could reasonably be expected to improve this patient's condition,       [x] (2) Or for diagnostic study;     AND     [x](2) The inpatient psychiatric services are provided while the individual is under the care of a physician and are included in the individualized plan of care.     Estimated length of stay/service 3-5 days    Plan for post-hospital care -outpatient f/u    Electronically signed by Zackary Gentile MD on 6/22/2022 at 2:12 PM

## 2022-06-22 NOTE — GROUP NOTE
Group Therapy Note    Date: 6/22/2022    Group Start Time: 1100  Group End Time: 1150  Group Topic: Cognitive Skills    BALDO Ortega Lung, CTRS        Group Therapy Note    Attendees: 10/17         Pt did not participate in Cognitive Skills Group at 1100am when encouraged by RT due to resting in room. Pt was offered talk time as an alternative to group but declined.          Discipline Responsible: Psychoeducational Specialist        Signature:  Handy Uriarte
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
## 2022-06-22 NOTE — ED NOTES
Patient remains sedated/sleeping at this time. Restraint removal discussed with Charge RN, Yannick Mclain and Howie Evans, . Restraints removed. Bryan Garg maintained.       Katia Jones RN  06/22/22 1200 [facility], RN  06/22/22 8899

## 2022-06-22 NOTE — H&P
History and Physical Service  [facility]    HISTORY AND PHYSICAL EXAMINATION            Date:   6/22/2022  Patient name:  Janelle Meigs  MRN:   [id number]  Account:  [de-identified]  YOB: 2000  PCP:    Chad Coker MD  Code Status:    Full Code    Chief Complaint:   Abn behavior    History Obtained From:     Patient ,medical record ,nursing staff    History of Present [address] [facility] Problems           Last Modified POA    * (Principal) Acute psychosis ([facility] [address] 6/22/2022 Yes           The patient is a 25 y.o. Non- / non  male who presents   Admitted through ER to [facility] in ER  31-year-old male brought in by police for mental health evaluation. Please reports they were called to scene and patient reportedly was asking them to shoot him, reports that he was being chased by another police force and they were trying to shoot him, patient was very paranoid and reportedly delusional, patient denies any drug or alcohol use. Past hx  Hx cerebral concussion 2 yrs ago  meds   lamictal , invega prior to admission       Past Medical History:   Diagnosis Date    ADHD (attention deficit hyperactivity disorder) 2005    ON RX    Fracture 08/2014    LT WRIST    Gestation period, 40 weeks     VANGINAL BIRTH, BIRTH WT 8LB NO COMPLICATIONS    Guardianship     GRANDMOTHER-JUAN IS LEGAL GUARDIAN, MOTHER HAS VISITATION BUT WILL NOT BE PRESENT DAY OF SURGER        Past Surgical History:     Past Surgical History:   Procedure Laterality Date    HARDWARE REMOVAL Left 10/3/14    wrist    WRIST CLOSED REDUCTION Left 8/22/2014        Medications Prior to Admission:     Prior to Admission medications    Medication Sig Start Date End Date Taking?  Authorizing Provider   lamoTRIgine (LAMICTAL) 25 MG tablet Take 25 mg by mouth 2 times daily   Yes Historical Provider, MD   paliperidone (INVEGA) 3 MG extended release tablet Take 3 mg by mouth every morning   Yes Historical Provider, MD   nicotine polacrilex (NICORETTE) 2 MG gum Take 2 mg by mouth every 2 hours as needed for Smoking cessation   Yes Historical Provider, MD        Allergies:     Patient has no known allergies. Social History:     Tobacco:    reports that he is a non-smoker but has been exposed to tobacco smoke. He has never used smokeless tobacco.  Alcohol:      reports no history of alcohol use. Drug Use:  reports current drug use. Drug: Marijuana Val Dinning). Family History:     Family History   Problem Relation Age of Onset    Diabetes Paternal Grandmother        Review of Systems:     Aileen Burger ,  ROS     SEE HPI          Respiratory ROS:            Negative for cough,                                              Negative for shortness of breath . Negative for wheezing ,     Cardiovascular ROS:     Negative for chest pain,                                             Negative for palpitations . Negative for worsening or new leg edema . Gastrointestinal ROS:   Negative for abdominal pain . Negative for change in bowel habits . Solitario Kennedy Dermatological ROS:      Negative for rash,                                            .  ==============                                                       Physical Exam:         Physical Exam   Vitals:    Vitals:    06/22/22 0230 06/22/22 0300 06/22/22 0348 06/22/22 0454   BP: 104/60 (!) 95/57 95/62    Pulse: 84 76 70    Resp: (!) 33 19 16    SpO2: 94% 96% 97%    Weight:    180 lb (81.6 kg)   Height:    6' (1.829 m)                    Body mass index is 24.41 kg/m².      General Appearance:   Alert , CO-OPERATIVE ,                 Skin:                             No rash or erythema  HEENT ;                           Head                        Symmetrical , within normal limits Eye                           Conjunctiva normal ,, sclera non-icteric . Ear                           External ear ok . Neck:                            No mass , no thyroid enlargement                                           Pulmonary/Chest:        Clear to auscultation bilaterally . No wheezes, rales or rhonchi . No abnormality on percussion                                                        Cardiovascular:            Normal rate, regular rhythm,                                          No murmur or  Gallop . Abdomen:                       Soft, non-tender                                           Normal bowels sounds,                                             Extremities:                    No  Edema .                                            Neurological ;                 No focal motor deficit ,                 No focal sensory deficit ,    Musculo-skeletal ;                  No  gait abnormality                  No significant joint abnormality,                   Psych:   see Psych notect ,                                                                                           Investigations:      Laboratory Testing:  Recent Results (from the past 24 hour(s))   CBC with Auto Differential    Collection Time: 06/22/22 12:00 AM   Result Value Ref Range    WBC 10.1 4.5 - 13.5 k/uL    RBC 5.05 4.5 - 5.9 m/uL    Hemoglobin 13.6 13.5 - 17.5 g/dL    Hematocrit 41.9 41 - 53 %    MCV 82.9 80 - 100 fL    MCH 26.9 26 - 34 pg    MCHC 32.5 31 - 37 g/dL    RDW 14.5 11.5 - 14.9 %    Platelets 612 993 - 776 k/uL    MPV 6.7 6.0 - 12.0 fL    Seg Neutrophils 67 (H) 34 - 64 %    Lymphocytes 22 (L) 25 - 45 %    Monocytes 10 (H) 2 - 8 %    Eosinophils % 0 0 - 4 %    Basophils 1 0 - 2 %    Segs Absolute 6.80 1.3 - 9.1 k/uL    Absolute Lymph # 2.20 1.0 - 4.8 k/uL    Absolute Mono # 1.00 0.1 - 1.3 k/uL    Absolute Eos # 0.00 0.0 - 0.4 k/uL    Basophils Absolute 0.10 0.0 - 0.2 k/uL   Comprehensive Metabolic Panel w/ Reflex to MG    Collection Time: 06/22/22 12:00 AM   Result Value Ref Range    Glucose 93 70 - 99 mg/dL    BUN 17 6 - 20 mg/dL    CREATININE 1.06 0.70 - 1.20 mg/dL    Calcium 10.0 8.6 - 10.4 mg/dL    Sodium 142 135 - 144 mmol/L    Potassium 3.9 3.7 - 5.3 mmol/L    Chloride 103 98 - 107 mmol/L    CO2 23 20 - 31 mmol/L    Anion Gap 16 9 - 17 mmol/L    Alkaline Phosphatase 98 40 - 129 U/L    ALT 14 5 - 41 U/L    AST 29 <40 U/L    Total Bilirubin 0.97 0.3 - 1.2 mg/dL    Total Protein 8.2 6.4 - 8.3 g/dL    Albumin 5.0 3.5 - 5.2 g/dL    GFR Non-African American >60 >60 mL/min    GFR African American >60 >60 mL/min    GFR Comment         Salicylate    Collection Time: 06/22/22 12:00 AM   Result Value Ref Range    Salicylate Lvl <1 (L) 3 - 10 mg/dL   Acetaminophen Level    Collection Time: 06/22/22 12:00 AM   Result Value Ref Range    Acetaminophen Level <5 (L) 10 - 30 ug/mL   Ethanol    Collection Time: 06/22/22 12:00 AM   Result Value Ref Range    Ethanol <10 <10 mg/dL    Ethanol percent <0.010 %       Recent Labs     06/22/22  0000   HGB 13.6   HCT 41.9   WBC 10.1   MCV 82.9      K 3.9      CO2 23   BUN 17   CREATININE 1.06   GLUCOSE 93   AST 29   ALT 14   LABALBU 5.0       Hematology:  Recent Labs     06/22/22  0000   WBC 10.1   RBC 5.05   HGB 13.6   HCT 41.9   MCV 82.9   MCH 26.9   MCHC 32.5   RDW 14.5      MPV 6.7     Chemistry:  Recent Labs     06/22/22  0000      K 3.9      CO2 23   GLUCOSE 93   BUN 17   CREATININE 1.06   ANIONGAP 16   LABGLOM >60   GFRAA >60   CALCIUM 10.0     Recent Labs     06/22/22  0000   PROT 8.2   LABALBU 5.0   AST 29   ALT 14   ALKPHOS 98   BILITOT 0.97       Imaging/Diagnostics:       CT HEAD WO CONTRAST    Result Date: 6/22/2022  EXAMINATION: CT OF THE HEAD WITHOUT CONTRAST 6/22/2022 1:15 am TECHNIQUE: CT of the head was performed without the administration of intravenous contrast. Automated exposure control, iterative reconstruction, and/or weight based adjustment of the mA/kV was utilized to reduce the radiation dose to as low as reasonably achievable. COMPARISON: None. HISTORY: Reason for Exam: ams FINDINGS: BRAIN/VENTRICLES: There is no acute intracranial hemorrhage, mass effect or midline shift. No abnormal extra-axial fluid collection. The gray-white differentiation is maintained without evidence of an acute infarct. There is no evidence of hydrocephalus. ORBITS: The visualized portion of the orbits demonstrate no acute abnormality. SINUSES: The visualized paranasal sinuses and mastoid air cells demonstrate no acute abnormality. SOFT TISSUES/SKULL:  No acute abnormality of the visualized skull or soft tissues. No acute intracranial abnormality.           Current Facility-Administered Medications   Medication Dose Route Frequency Provider Last Rate Last Admin    acetaminophen (TYLENOL) tablet 650 mg  650 mg Oral Q4H PRN Edd Rendon MD        aluminum & magnesium hydroxide-simethicone (MAALOX) 200-200-20 MG/5ML suspension 30 mL  30 mL Oral Q6H PRN Edd Rendon MD        hydrOXYzine HCl (ATARAX) tablet 50 mg  50 mg Oral TID PRN Edd Rendon MD        ibuprofen (ADVIL;MOTRIN) tablet 400 mg  400 mg Oral Q6H PRN dEd Rendon MD        nicotine (NICODERM CQ) 14 MG/24HR 1 patch  1 patch TransDERmal Daily Edd Rendon MD        polyethylene glycol (GLYCOLAX) packet 17 g  17 g Oral Daily PRN Edd Rendon MD        traZODone (DESYREL) tablet 50 mg  50 mg Oral Nightly PRN Edd Rendon MD        haloperidol lactate (HALDOL) injection 5 mg  5 mg IntraMUSCular Q4H PRN Edd Rendon MD        And    LORazepam (ATIVAN) injection 2 mg  2 mg IntraMUSCular Q4H PRBRE Rendon MD And    diphenhydrAMINE (BENADRYL) injection 50 mg  50 mg IntraMUSCular Q4H PRN Edd Rendon MD        LORazepam (ATIVAN) tablet 2 mg  2 mg Oral Q4H PRN Edd Rendon MD        And    haloperidol (HALDOL) tablet 5 mg  5 mg Oral Q4H PRN Edd Rendon MD        paliperidone (INVEGA) extended release tablet 3 mg  3 mg Oral Daily ROYAL Campbell - CNP        0.9 % sodium chloride bolus  1,000 mL IntraVENous Once Dannis Failing, DO         Impressions :      1. Hospital Problems           Last Modified POA    * (Principal) Acute psychosis ([facility] [address] 6/22/2022 Yes                 Plans:     1      Vital   istable ,    [x] yes     [] NO    ___     Labs    reviewed  Significant findings -nothing significant--       Drug toxicology screen negative [x] yes     [] NO       ----    Home meds reviewed    Reconcilliation done [x] yes     [] NO   .        Will monitor             Josefina Ruby MD  6/22/2022  1:57 PM

## 2022-06-22 NOTE — PROGRESS NOTES
Pharmacy Medication History Note      List of current medications patient is taking is complete. Source of information: Care Everywhere, OARRS    Changes made to medication list:  Medications removed (include reason, ex. therapy complete or physician discontinued, noncompliance):  none    Medications added/doses adjusted: Added Lamotrigine 25 mg twice daily  Added Paliperidone 3 mg daily  Added Nicotine 2 mg every 2 hours as needed    Other notes (ex. Recent course of antibiotics, Coumadin dosing):  Patient was discharged from [facility] on 6/17/22. Current Home Medication List at Time of Admission:  Prior to Admission medications    Medication Sig   lamoTRIgine (LAMICTAL) 25 MG tablet Take 25 mg by mouth 2 times daily   paliperidone (INVEGA) 3 MG extended release tablet Take 3 mg by mouth every morning   nicotine polacrilex (NICORETTE) 2 MG gum Take 2 mg by mouth every 2 hours as needed for Smoking cessation         Please let me know if you have any questions about this encounter. Thank you!     Electronically signed by Rachell Martinez [facility] on 6/22/2022 at 9:53 AM

## 2022-06-22 NOTE — ED PROVIDER NOTES
EMERGENCY DEPARTMENT ENCOUNTER    Pt Name: Medina Barton  MRN: [id number]  [place] 2000  Date of evaluation: 6/22/22  CHIEF COMPLAINT       Chief Complaint   Patient presents with    Mental Health Problem     HISTORY OF PRESENT ILLNESS   27-year-old male brought in by police for mental health evaluation. Please reports they were called to scene and patient reportedly was asking them to shoot him, reports that he was being chased by another police force and they were trying to shoot him, patient was very paranoid and reportedly delusional, patient denies any drug or alcohol use. The history is provided by the police. The history is limited by the condition of the patient. REVIEW OF SYSTEMS     Review of Systems   Unable to perform ROS: Psychiatric disorder     PASTMEDICAL HISTORY     Past Medical History:   Diagnosis Date    ADHD (attention deficit hyperactivity disorder) 2005    ON RX    Fracture 08/2014    LT WRIST    Gestation period, 40 weeks     VANGINAL BIRTH, BIRTH WT 8LB NO COMPLICATIONS    Guardianship     GRANDMOTHER-JUAN IS LEGAL GUARDIAN, MOTHER HAS VISITATION BUT WILL NOT BE PRESENT DAY OF SURGER     Past Problem List  Patient Active Problem List   Diagnosis Code    Fracture of distal radius and ulna S52.509A, S52.609A    Acute psychosis ([facility] [address] F23    Closed head injury S09.90XA    Cerebral concussion S06. 0X7A    Depression with suicidal ideation F32. A, R45.851     SURGICAL HISTORY       Past Surgical History:   Procedure Laterality Date    HARDWARE REMOVAL Left 10/3/14    wrist    WRIST CLOSED REDUCTION Left 8/22/2014     CURRENT MEDICATIONS     There are no discharge medications for this patient. ALLERGIES     has No Known Allergies. FAMILY HISTORY     He indicated that his mother is alive. He indicated that his father is alive. He indicated that both of his sisters are alive. He indicated that both of his brothers are alive.  He indicated that his maternal grandmother is alive. He indicated that his maternal grandfather is . He indicated that his paternal grandmother is alive. He indicated that his paternal grandfather is alive. SOCIAL HISTORY       Social History     Tobacco Use    Smoking status: Passive Smoke Exposure - Never Smoker    Smokeless tobacco: Never Used   Vaping Use    Vaping Use: Some days   Substance Use Topics    Alcohol use: No    Drug use: Yes     Types: Marijuana (Weed)     PHYSICAL EXAM     INITIAL VITALS: BP 95/62   Pulse 70   Resp 16   Wt 180 lb (81.6 kg)   SpO2 97%   BMI 24.41 kg/m²    Physical Exam  Vitals and nursing note reviewed. Constitutional:       General: He is not in acute distress. Appearance: Normal appearance. He is not ill-appearing. HENT:      Head: Normocephalic and atraumatic. Right Ear: External ear normal.      Left Ear: External ear normal.      Nose: Nose normal.      Mouth/Throat:      Mouth: Mucous membranes are moist.   Eyes:      Extraocular Movements: Extraocular movements intact. Pupils: Pupils are equal, round, and reactive to light. Cardiovascular:      Rate and Rhythm: Normal rate and regular rhythm. Pulses: Normal pulses. Heart sounds: Normal heart sounds. Pulmonary:      Effort: Pulmonary effort is normal.      Breath sounds: Normal breath sounds. Abdominal:      General: Abdomen is flat. Palpations: Abdomen is soft. Tenderness: There is no abdominal tenderness. Musculoskeletal:         General: No tenderness. Normal range of motion. Cervical back: Neck supple. Skin:     General: Skin is warm and dry. Capillary Refill: Capillary refill takes less than 2 seconds. Neurological:      General: No focal deficit present. Mental Status: He is alert and oriented to person, place, and time. Cranial Nerves: Cranial nerves are intact. Sensory: Sensation is intact. Motor: Motor function is intact.    Psychiatric:         Behavior: Behavior normal.         Thought Content: Thought content is paranoid and delusional. Thought content does not include homicidal or suicidal ideation. MEDICAL DECISION MAKIN-year-old male presents for mental health evaluation. On initial exam patient in no acute distress, patient was brought in by police after he reportedly was asking them to shoot himself and that he was being followed by other police officers, patient does have mental health history, will check basic labs and imaging    Patient became very aggressive with staff, attempts at verbal de-escalation were unsuccessful and both chemical and physical restraint had to be used    Labs reviewed and unremarkable, imaging was negative    Given concern for acute psychosis, patient to be admitted for further psych evaluation, patient was on a pink slip from PD         CRITICAL CARE:       PROCEDURES:    Procedures    DIAGNOSTIC RESULTS   EKG:All EKG's are interpreted by the Emergency Department Physician who either signs or Co-signs this chart in the absence of a cardiologist.        RADIOLOGY:All plain film, CT, MRI, and formal ultrasound images (except ED bedside ultrasound) are read by the radiologist, see reports below, unless otherwisenoted in MDM or here. CT HEAD WO CONTRAST   Final Result   No acute intracranial abnormality. LABS: All lab results were reviewed by myself, and all abnormals are listed below.   Labs Reviewed   CBC WITH AUTO DIFFERENTIAL - Abnormal; Notable for the following components:       Result Value    Seg Neutrophils 67 (*)     Lymphocytes 22 (*)     Monocytes 10 (*)     All other components within normal limits   SALICYLATE LEVEL - Abnormal; Notable for the following components:    Salicylate Lvl <1 (*)     All other components within normal limits   ACETAMINOPHEN LEVEL - Abnormal; Notable for the following components:    Acetaminophen Level <5 (*)     All other components within normal limits   COMPREHENSIVE METABOLIC PANEL W/ REFLEX TO MG FOR LOW K   ETHANOL   URINALYSIS WITH MICROSCOPIC   URINE DRUG SCREEN       EMERGENCY DEPARTMENTCOURSE:         Vitals:    Vitals:    06/22/22 0200 06/22/22 0230 06/22/22 0300 06/22/22 0348   BP: 103/60 104/60 (!) 95/57 95/62   Pulse: 80 84 76 70   Resp: 23 (!) 33 19 16   SpO2: 97% 94% 96% 97%   Weight:           The patient was given the following medications while in the emergency department:  Orders Placed This Encounter   Medications    0.9 % sodium chloride bolus    haloperidol lactate (HALDOL) injection 5 mg    LORazepam (ATIVAN) injection 2 mg    diphenhydrAMINE (BENADRYL) injection 50 mg    acetaminophen (TYLENOL) tablet 650 mg    aluminum & magnesium hydroxide-simethicone (MAALOX) 200-200-20 MG/5ML suspension 30 mL    hydrOXYzine HCl (ATARAX) tablet 50 mg    ibuprofen (ADVIL;MOTRIN) tablet 400 mg    nicotine (NICODERM CQ) 14 MG/24HR 1 patch    polyethylene glycol (GLYCOLAX) packet 17 g    traZODone (DESYREL) tablet 50 mg    AND Linked Order Group     haloperidol lactate (HALDOL) injection 5 mg     LORazepam (ATIVAN) injection 2 mg     diphenhydrAMINE (BENADRYL) injection 50 mg    AND Linked Order Group     LORazepam (ATIVAN) tablet 2 mg     haloperidol (HALDOL) tablet 5 mg     CONSULTS:  IP CONSULT TO INTERNAL MEDICINE    FINAL IMPRESSION      1. Acute psychosis ([facility] [address]          DISPOSITION/PLAN   DISPOSITION Admitted 06/22/2022 02:44:25 AM      PATIENT REFERRED TO:  No follow-up provider specified. DISCHARGE MEDICATIONS:  There are no discharge medications for this patient. The care is provided during an unprecedented national emergency due to the novel coronavirus, COVID 19.   Juan Pablo Moreno, DO                    Juan Pablo Moreno, DO  06/22/22 [id number]

## 2022-06-22 NOTE — BH NOTE
[facility]  Admission Note     Admission Type:   Admission Type: Involuntary    Reason for admission:  Reason for Admission: picked up by [employer] for erratic behavior, told the police he wanted them to shoot him    PATIENT STRENGTHS:   family/friend support systems    Patient Strengths and Limitations:   motivation for treatment    Addictive Behavior:   Addictive Behavior  In the Past 3 Months, Have You Felt or Has Someone Told You That You Have a Problem With  : None    Medical Problems:   Past Medical History:   Diagnosis Date    ADHD (attention deficit hyperactivity disorder) 2005    ON RX    Fracture 08/2014    LT WRIST    Gestation period, 40 weeks     VANGINAL BIRTH, BIRTH WT 8LB NO COMPLICATIONS    Guardianship     GRANDMOTHER-JUAN IS LEGAL GUARDIAN, MOTHER HAS VISITATION BUT WILL NOT BE PRESENT DAY OF SURGER       Status EXAM:  Mental Status and Behavioral Exam  Normal: Yes  Level of Assistance: Independent/Self  Facial Expression: Avoids Gaze,Expressionless,Flat  Affect: Blunt,Unstable  Level of Consciousness: Alert  Frequency of Checks: 4 times per hour, close  Mood:Normal: No  Mood: Labile,Suspicious,Helpless  Motor Activity:Normal: Yes  Eye Contact: Fair  Observed Behavior: Withdrawn,Impulsive,Preoccupied  Sexual Misconduct History: Current - no  Preception: [place] to person,[place] to time  Attention:Normal: No  Attention: Distractible  Thought Processes: Tangential  Thought Content:Normal: No  Thought Content: Preoccupations,Delusions  Depression Symptoms: No problems reported or observed. Anxiety Symptoms: Generalized  [place] Symptoms: Poor judgment  Hallucinations: None  Delusions: Yes  Delusions:  Other (comment)  Memory:Normal: No  Memory: Poor recent,Poor remote  Insight and Judgment: No  Insight and Judgment: Poor judgment,Poor insight    Tobacco Screening:  Practical Counseling, on admission, lisha X, if applicable and completed (first 3 are required if patient doesn't refuse): (x )  Recognizing danger situations (included triggers and roadblocks)                    (x )  Coping skills (new ways to manage stress, exercise, relaxation techniques, changing routine, distraction)                                                           (x )  Basic information about quitting (benefits of quitting, techniques in how to quit, available resources  ( ) Referral for counseling faxed to [facility]                                           ( ) Patient refused counseling  ( ) Patient has not smoked in the last 30 days    Metabolic Screening:    Lab Results   Component Value Date    LABA1C 5.6 04/11/2020       Lab Results   Component Value Date    CHOL 132 04/11/2020     Lab Results   Component Value Date    TRIG 51 04/11/2020     Lab Results   Component Value Date    HDL 37 (L) 04/11/2020     No components found for: LDLCAL  No results found for: LABVLDL      Body mass index is 24.41 kg/m². BP Readings from Last 2 Encounters:   06/22/22 95/62   04/22/22 130/82           Pt admitted with followings belongings:  Dental Appliances: None  Vision - Corrective Lenses: None  Hearing Aid: None  Jewelry: None  Body Piercings Removed: N/A  Clothing: Undergarments,Footwear,Socks,Shorts  Other Valuables: Other (Comment) (n/a)      Patient came in pink slipped from [facility] after police were called to a gas station after they got report someone was shot there. Upon arrival patient was not shot, but repeatedly told officers to shot him. Making bizarre statements, saying police have been chasing and shooting at him. Also made statements accusing police, mental health system, medical services, and women of trying to destroy him. Patient made attempts to swing at nurses in [facility] while they were trying to get blood work. Patient was placed in restraints and got PRN medications.   On arrival to unit patient was unable to answer questions, scattered superficial lacerations all over body from altercation from police. Did not sign any paperwork. Patient's home medications were reviewed. Patient oriented to surroundings and program expectations and copy of patient rights given. Received admission packet. Pink slipped, refused to sign all paperwork on admit. Patient verbalizes understanding.                      Kishan Ortez

## 2022-06-22 NOTE — ED NOTES
Provisional Diagnosis:     Patient presented to ED via Law Enforcement on an application for emergency admission. Patient has history of schzophrenia. Psychosocial and Contextual Factors:     Per police, patient is homeless and was picked up at [facility]. C-SSRS Summary:    Patient denies suicidal ideation, but per police, patient \"ordered other officers to shoot him\". Patient: X  Family:   Agency: X ([employer])    Substance Abuse:  Patient refuses to answer    Present Suicidal Behavior:    Patient denies suicidal ideation, but per police, patient \"ordered other officers to shoot him\". Verbal: X    Attempt:     Past Suicidal Behavior:   Per Epic, patient has history of depression with suicidal ideation. Verbal: X    Attempt:     Self-Injurious/Self-Mutilation:  Patient does present to ED with several superficial lacerations to his neck and chest that he inflicted upon himself with a piece of glass    Violence Current or Past:  Patient was identified to ED staff by [employer] as \"combative\". Upon arrival to ED, patient outwardly identified his dislike of women, indicating they [place] men\" and are \"predators\". Patient made verbal threats to nursing staff and public safety when attempting to draw blood/administer medications. Trauma Identified:    Patient refuses to engage with this writer, therefore no trauma was identified. Protective Factors:    Patient has insurance. Risk Factors:    Patient allegedly homeless (per [employer]). Clinical Summary:    Patient is a 25year old  male who presented to ED via [employer] on an application for emergency admission stating \"This officer made contact with Rylee Rodriguez after he had ordered other officers to shoot him. Upon contact with [facility], he stated that he wanted this officer to shoot him. [facility] was delusional in conversation. He stated that BEHAVIORAL MEDICINE AT [facility] was chasing and shooting at him on this night.   Aileen's speech was incoherent. He continuously talked, jumping from subject to subject and accusing police, medical services, the mental health system, and women of trying to [place]" him. Cassie Lala admitted to cutting his chest and back with glass. \"    Upon arrival to ED, patient was refusing to engage with ED physician, nursing staff or this writer. Patient was oriented to who he is and where is is but refused to identify why he was here at this hospital, continuing to say [facility] Mr. Esmer Field" and nodding towards the [facility] officer who brought him in. Patient denied any suicidal ideation to this writer, but did admit to cutting his neck, face and chest with a \"piece of glass\". Patient did not identify why he did this to himself. Patient is very preoccupied by his disdain towards women. Patient continuously talking toward women in ED that they are \"predators\" and they are \"out to destroy all men\". Patient also indicates that women \"ain't shit\" and threatened to \"swing\" at ED nursing staff. Patient refuses to further engage with this writer, stating he \"had the floor\" and no one else was \"allowed to talk\". Patient quoting the Bible regarding \"who was created first\". Patient refuses to allow blood work to be obtained and refuses to allow medications to be be administered. Patient threatening to \"prosecute every single one of you people\". Level of Care Disposition: This writer consulted with Dr HENDERSON who recommended inpatient hospitalization for safety and stabilization. Patient placed on application for emergency admission to [facility].

## 2022-06-22 NOTE — H&P
Department of Psychiatry  Attending Physician Psychiatric Assessment     Reason for Admission to Psychiatric Unit:  Concerns about patient's safety in the community    CHIEF COMPLAINT:  Acute psychosis    History obtained from: Patient, electronic medical record          HISTORY OF PRESENT ILLNESS:    Eva Rizo is a 25 y.o. male who has a past medical history of mental illness and cerebral concussion who presented to the ER accompanied by police after patient reportedly was asking them to shoot him. Patient was also presenting as extremely paranoid and delusional. Patient was placed on pink slip by [employer]. Per ED records, \"Patient is a 25year old [facility] American male who presented to ED via [address] on an application for emergency admission stating \"This officer made contact with Eva Rizo after he had ordered other officers to shoot him. Upon contact with [facility], he stated that he wanted this officer to shoot him. [facility] was delusional in conversation. He stated that BEHAVIORAL MEDICINE AT [facility] was chasing and shooting at him on this night. Aileen's speech was incoherent. He continuously talked, jumping from subject to subject and accusing police, medical services, the mental health system, and women of trying to [place]" him. [facility] admitted to cutting his chest and back with glass. \" Upon arrival to ED, patient was refusing to engage with ED physician, nursing staff or this writer. Patient was oriented to who he is and where is is but refused to identify why he was here at this hospital, continuing to say [facility] Mr. Trenton Jones" and nodding towards the [employer] officer who brought him in. Patient denied any suicidal ideation to this writer, but did admit to cutting his neck, face and chest with a \"piece of glass\". Patient did not identify why he did this to himself. Patient is very preoccupied by his disdain towards women.   Patient continuously talking toward women in ED that they are \"predators\" and they are \"out to destroy all men\". Patient also indicates that women \"ain't shit\" and threatened to \"swing\" at ED nursing staff. Patient refuses to further engage with this writer, stating he \"had the floor\" and no one else was \"allowed to talk\". Patient quoting the Bible regarding \"who was created first\". Patient refuses to allow blood work to be obtained and refuses to allow medications to be be administered. Patient threatening to \"prosecute every single one of you people\". Upon presentation to ED patient was extremely agitated, threatening of staff, and attempting to swing at staff. Patient did require both IM emergency medications as well as physical restraints to keep patient in behavioral control. This writer attempts to see patient at bedside accompanied by male nursing staff due to patient's reported paranoia of women. Patient was somnolent but did respond to verbal stimuli. At first he only shrugs his shoulders to answers to assessment questions. When asked about events leading up to hospitalization patient states, \"I don't know, you people put me in this [facility] hospital.\"  When asked why patient believed he was in the hospital patient states, \"I don't know you guys are weirdos. \"  This writer asked patient if he asked police to shoot him and patient denied this. He then turned away from this writer and refused to answer any other assessment questions. This writer tried multiple times to engage patient however he continued to refuse this writer. Due to patient's noncompliance and irritability this interview was terminated. Patient was encouraged to get up and eat breakfast.     Per documentation from pharmacy it appears that patient was recently admitted to [facility] and was discharged 6/17/2022. This writer cannot find any information on this hospitalization however it appears that patient was placed on Lamictal and Invega.  From previous admission to this facility in August 2021 it appears that patient was discharged on Remeron. History of head trauma: [x] Yes [] No  History of cerebral concussion    History of seizures: No reported history of     History of violence or aggression: [x] Yes [] No         PSYCHIATRIC HISTORY:  [x] Yes [] No    Patient has been historically linked with [facility] unknown if he has been compliant or not. Denies lifetime suicide attempts. Multiple previous psychiatric hospital admissions. Last admitted to this facility 8/16/21. Recently inpatient at [facility] 6/17/22. Past psychiatric medications includes:   Depakote, Adderall, Invega, Lamictal, Invega Sustenna, Remeron     Medication Compliance: No    Adverse reactions from psychotropic medications: [x] Yes [] No  Per previous documentation Depakote caused increased irritability and aggression.          Lifetime Psychiatric Review of Systems         Depression: Reported history of     Anxiety: Unable to assess due to patient's noncompliance with assessment and irritability     Panic Attacks: Unable to assess due to patient's noncompliance with assessment and irritability     Zuleika or Hypomania: Reported history of      Phobias: Unable to assess due to patient's noncompliance with assessment and irritability     Obsessions and Compulsions: Unable to assess due to patient's noncompliance with assessment and irritability     Visual Hallucinations: Unable to assess due to patient's noncompliance with assessment and irritability     Auditory Hallucinations:Unable to assess due to patient's noncompliance with assessment and irritability     Delusions: Per ER documentation currently exhibiting     Paranoia: Per ER documentation currently exhibiting     PTSD: Reported history of     Past Medical History:        Diagnosis Date    ADHD (attention deficit hyperactivity disorder) 2005    ON RX    Fracture 08/2014    LT WRIST    Gestation period, 40 weeks     VANGINAL BIRTH, BIRTH WT 8LB NO COMPLICATIONS    Guardianship     GRANDMOTHER-JUAN IS LEGAL GUARDIAN, MOTHER HAS VISITATION BUT WILL NOT BE PRESENT DAY OF SURGER       Past Surgical History:        Procedure Laterality Date    HARDWARE REMOVAL Left 10/3/14    wrist    WRIST CLOSED REDUCTION Left 8/22/2014       Allergies:  Patient has no known allergies. Social History:     Gathered from previous admission and ER documentation  Born in: Born in [place] and raised in [address]  Family: His mother resides in [facility], his father is incarcerated. He is 1 of 5 siblings. One brother is no longer living he has 1 living brother and 2 sisters. Patient reports that he was raised by his maternal grandmother. Highest Level of Education: Left high school in the 12th grade  Occupation: Currently unemployed  Marital Status: Single  Children: No children  Residence: Possibly homeless and living at [place] [id number]  Stressors: Chronic mental illness  Patient Assets/Supportive Factors: Patient is seeking additional support         DRUG USE HISTORY  Social History     Tobacco Use   Smoking Status Passive Smoke Exposure - Never Smoker   Smokeless Tobacco Never Used     Social History     Substance and Sexual Activity   Alcohol Use No     Social History     Substance and Sexual Activity   Drug Use Yes    Types: Marijuana (Weed)       Unable to assess due to patient's irritability and noncompliance with assessment. Per previous documentation patient had endorsed marijuana use. Unfortunately no UDS was obtained upon admission. LEGAL HISTORY:   HISTORY OF INCARCERATION: [x] Yes [] No    Family History:       Problem Relation Age of Onset    Diabetes Paternal Grandmother        Psychiatric Family History - Per previous admmission    Patient endorses psychiatric family history.      Suicides in family: [] Yes [x] No    Substance use in family: [x] Yes [] No         PHYSICAL EXAM:  Vitals:  BP 95/62   Pulse 70   Resp 16   Ht 6' (1.829 m)   Wt 180 lb (81.6 kg)   SpO2 97%   BMI 24.41 kg/m²     Pain Level: Unable to assess due to patient's irritability and noncompliance with assessment. LABS:  Labs reviewed: [x] Yes            Review of Systems   Constitutional: Negative for chills and weight loss. HENT: Negative for ear pain and nosebleeds. Eyes: Negative for blurred vision and photophobia. Respiratory: Negative for cough, shortness of breath and wheezing. Cardiovascular: Negative for chest pain and palpitations. Gastrointestinal: Negative for abdominal pain, diarrhea and vomiting. Genitourinary: Negative for dysuria and urgency. Musculoskeletal: Negative for falls and joint pain. Skin: Negative for itching and rash. Neurological: Negative for tremors, seizures and weakness. Endo/Heme/Allergies: Does not bruise/bleed easily. Physical Exam:   Constitutional:  Appears well-developed and well-nourished, no acute distress. HENT:   Head: Normocephalic and atraumatic. Eyes: Conjunctivae are normal. Right eye exhibits no discharge. Left eye exhibits no discharge. No scleral icterus. Neck: Normal range of motion. Neck supple. Pulmonary/Chest:  No respiratory distress or accessory muscle use, no wheezing. Cardiac: Regular rate and rhythm. Abdominal: Soft. Non-tender. Exhibits no distension. Musculoskeletal: Normal range of motion. Exhibits no edema. Neurological: cranial nerves II-XII grossly in tact, normal gait and station. Skin: Skin is warm and dry. Patient is not diaphoretic. No erythema.       Mental Status Examination:    Level of consciousness: Somnolent but responds to verbal stimuli  Appearance:  Appropriate attire, resting in bed, poor grooming   Behavior/Motor: Irritable, evasive, withdrawn  Attitude toward examiner:  Non-cooperative, poor eye contact, turns away and ignores writer, somewhat suspicious  Speech: Irritable tone, quiet volume, mumbles at times  Mood: Irritable  Affect: Mood-congruent  Thought processes: coherent and illogical  Thought content: Unable to assess due to patient's irritability and noncompliance with assessment. Homicidal Ideation: Unable to assess due to patient's irritability and noncompliance with assessment. Perceptual Disturbances: Unable to assess due to patient's irritability and noncompliance with assessment. Delusions: Exhibits paranoid and persecutory delusions  Cognition:  Unable to assess due to patient's irritability and noncompliance with assessment. Concentration:Unable to assess due to patient's irritability and noncompliance with assessment. Memory:Unable to assess due to patient's irritability and noncompliance with assessment. Insight &Judgment: Poor to limited         DSM-5 Diagnosis    Principal Problem: Acute psychosis (HCC)        Psychosocial and Contextual factors:  Unable to assess due to patient's irritability and noncompliance with assessment. Past Medical History:   Diagnosis Date    ADHD (attention deficit hyperactivity disorder) 2005    ON RX    Fracture 08/2014    LT WRIST    Gestation period, 40 weeks     VANGINAL BIRTH, BIRTH WT 8LB NO COMPLICATIONS    Guardianship     GRANDMOTHER-JUAN IS LEGAL GUARDIAN, MOTHER HAS VISITATION BUT WILL NOT BE PRESENT DAY OF SURGER        TREATMENT CONSIDERATIONS    Continue inpatient psychiatric treatment. Home medications reviewed. Start Invega 3 mg daily  Problem list updated. Monitor need and frequency of PRN medications. Attempt to develop insight. Follow-up daily while inpatient. Reviewed risks and benefits as well as potential side effects with patient.     CONSULTS [x] Yes [] No  Internal Medicine for Medical H&P    Risk Management: close watch per standard protocol      Psychotherapy: participation in milieu and group and individual sessions with Attending Physician,  and Physician Assistant/CNP      Estimated length of stay:  2-14 days      GENERAL PATIENT/FAMILY EDUCATION  Patient will understand basic signs and symptoms, patient will understand benefits/risks and potential side effects from proposed medications, and patient will understand their role in recovery. Family is active in patient's care. Patient assets that may be helpful during treatment include: Intent to participate and engage in treatment, sufficient fund of knowledge and intellect to understand and utilize treatments. Goals:    1) Remission of acute psychosis . 2) Stabilization of symptoms prior to discharge. 3) Establish efficacy and tolerability of medications. Behavioral Services  Medicare Certification     Admission Day 1  I certify that this patient's inpatient psychiatric hospital admission is medically necessary for:    x (1) treatment which could reasonably be expected to improve this patient's condition, or    x (2) diagnostic study or its equivalent. Time Spent: 60 minutes    Bettye Spencer is a 25 y.o. male being evaluated face to face    --ROYAL Leonard CNP on 6/22/2022 at 11:19 AM    An electronic signature was used to authenticate this note. I independently saw and evaluated the patient. I reviewed the  documentation above. Any additional comments or changes to the   documentation are stated below otherwise agree with assessment. I have noted the circumstances of the patient's admission. The patient was brought in by the police. He had been asking the police to shoot him. The patient has been cutting his neck face and chest with a piece of glass. He has been incoherent in his speech. Patient was seen face-to-face. He was lethargic. He has received. Medication. He was irritable with my questions. He told me that there was nothing wrong with him and there was no reason for him to be here. He was unwilling to disclose the circumstances of his admission.   We will start the patient on Invega 3 mg daily      PLAN  Medications as noted above  Attempt to develop insight  Psycho-education conducted. Estimated Length of Stay is 3-5 days  Supportive Therapy conducted.   Follow-up daily while on inpatient unit    Electronically signed by Torrie Celaya MD on 6/22/22 at 4:20 PM EDT

## 2022-06-22 NOTE — PLAN OF CARE
[facility]  Initial Interdisciplinary Treatment Plan NOTE      Original treatment plan Date & Time: 6/22/2022                                    753am    Admission Type:  Admission Type: Involuntary    Reason for admission:   Reason for Admission: picked up by [employer] for erratic behavior, told the police he wanted them to shoot him    Estimated Length of Stay:  5-7days  Estimated Discharge Date: to be determined by physician    PATIENT STRENGTHS:  Patient Strengths:   Patient Strengths and Limitations:   Addictive Behavior: Addictive Behavior  In the Past 3 Months, Have You Felt or Has Someone Told You That You Have a Problem With  : None  Medical Problems:  Past Medical History:   Diagnosis Date    ADHD (attention deficit hyperactivity disorder) 2005    ON RX    Fracture 08/2014    LT WRIST    Gestation period, 40 weeks     VANGINAL BIRTH, BIRTH WT 8LB NO COMPLICATIONS    Guardianship     GRANDMOTHER-JUAN IS LEGAL GUARDIAN, MOTHER HAS VISITATION BUT WILL NOT BE PRESENT DAY OF SURGER     Status EXAM:Mental Status and Behavioral Exam  Normal: Yes  Level of Assistance: Independent/Self  Facial Expression: Avoids Gaze,Expressionless,Flat  Affect: Blunt,Unstable  Level of Consciousness: Alert  Frequency of Checks: 4 times per hour, close  Mood:Normal: No  Mood: Labile,Suspicious,Helpless  Motor Activity:Normal: Yes  Eye Contact: Fair  Observed Behavior: Withdrawn,Impulsive,Preoccupied  Sexual Misconduct History: Current - no  Preception: [place] to person,[place] to time  Attention:Normal: No  Attention: Distractible  Thought Processes: Tangential  Thought Content:Normal: No  Thought Content: Preoccupations,Delusions  Depression Symptoms: No problems reported or observed. Anxiety Symptoms: Generalized  Zuleika Symptoms: Poor judgment  Hallucinations: None  Delusions: Yes  Delusions:  Other (comment)  Memory:Normal: No  Memory: Poor recent,Poor remote  Insight and Judgment: No  Insight and Judgment: Poor judgment,Poor insight    EDUCATION:   Learner Progress Toward Treatment Goals: reviewed group plans and strategies for care    Method:group therapy, medication compliance, individualized assessments and care planning    Outcome: needs reinforcement    PATIENT GOALS: to be discussed with patient within 72 hours    PLAN/TREATMENT RECOMMENDATIONS:     continue group therapy , medications compliance, goal setting, individualized assessments and care, continue to monitor pt on unit      SHORT-TERM GOALS:   Time frame for Short-Term Goals: 5-7 days    LONG-TERM GOALS:  Time frame for Long-Term Goals: 6 months  Members Present in Team Meeting: See Signature Sheet    Awais Conrad

## 2022-06-22 NOTE — CARE COORDINATION
BHI Biopsychosocial Assessment    Current Level of Psychosocial Functioning     Independent   Dependent X    Minimal Assist      Psychosocial High Risk Factors (check all that apply)  Unable to obtain meds   Chronic illness/pain    Substance abuse X Marijuana   Lack of Family Support X  Financial stress   Isolation X  Inadequate Community Resources   Suicide attempt(s)  Not taking medications X  Victim of crime   Developmental Delay  Unable to manage personal needs  X  Age 72 or older   Homeless X  No transportation X  Readmission within 30 days  Unemployment  Traumatic Event    Psychiatric Advanced Directives: none reported     Family to Involve in Treatment: Lack of family support     Sexual Orientation:  [facility]    Patient Strengths: insurance, followed up with [address] [place] [address] in the past for Treatment    Patient Barriers: presenting on admission with paranoia and delusions, , it was noted that pt was asking police to shoot him prior to admission     Opiate Education Provided: N/A PT denies and does not have a documented history of Opiate or Heroin use/ abuse. Pt reports Marijuana use. There was not a drug screen taken upon admission. CMHC/mental health history: Pt has been linked in the past and followed up with the [facility] but unsure if PT has been compliant with medication or treatment. Pt refused to discuss his treatment history with  on this date. Plan of Care   medication management, group/individual therapies, family meetings, psycho -education, treatment team meetings to assist with stabilization, referral to community resources. Initial Discharge Plan: To Be Detrmined, pt is resistant to discussing his plans for discharge on this date. Pt will be provided with resource information and will be linked for Outpatient treatment at discharge for continuation of care.        Clinical Summary:  Louis Delvalle is a 25 y.o. male who has a past medical history of mental illness and cerebral concussion who presented upon admission with Acute Psychosis and Paranoia. It was documented in PT chart that PT was brought in by the [address] after asking police to shoot him. Patient was also presenting as extremely paranoid and delusional. Patient was placed on pink slip by [address]       ,It was also documented in pt chart that pt told [place] police that the [facility] Department was chasing him and shooting him. It was also documented that PT was refusing a blood test and refusing care while at the ED. Patient did require both IM emergency medications as well as physical restraints to keep patient in behavioral control while in the ED. This writer attempts to see patient at bedside , pt was non-compliant with assessment and refused to answer most of the questions. This writer tried multiple times to engage patient however he continued to refuse this writer. Due to patient's noncompliance and irritability this interview was terminated.   Per documentation from pharmacy it appears that patient was recently admitted to [facility] and was discharged 6/17/2022. Pt most recent stay at [facility] was in August 2021.  Pt has been linked in the past and [facility] but unsure if PT has been compliant with medication or treatment. Pt refused to discuss his treatment history with  on this date.

## 2022-06-22 NOTE — BH NOTE
Patient states he is here against his will. Patient states he will deny all medications and testing and refuses outstanding EKG. Patient states he will do the urine drug screen and will let staff know when he can provide it.

## 2022-06-22 NOTE — PLAN OF CARE
Problem: Depression/Self Harm  Goal: Effect of psychiatric condition will be minimized and patient will be protected from self harm  Description: INTERVENTIONS:  1. Assess impact of patient's symptoms on level of functioning, self care needs and offer support as indicated  2. Assess patient/family knowledge of depression, impact on illness and need for teaching  3. Provide emotional support, presence and reassurance  4. Assess for possible suicidal thoughts or ideation. If patient expresses suicidal thoughts or statements do not leave alone, initiate Suicide Precautions, move to a room close to the nursing station and obtain sitter  5. Initiate consults as appropriate with Mental Health Professional, Spiritual Care, Psychosocial CNS, and consider a recommendation to the LIP for a Psychiatric Consultation  6/22/2022 0956 by Marlen Foster RN  Outcome: Progressing  Note: Q15 minute rounding for patient safety. Patient refused morning assessment by nurse and NP. Patient did not eat breakfast.  Behavior controlled.

## 2022-06-23 LAB
EKG ATRIAL RATE: 89 BPM
EKG P AXIS: 77 DEGREES
EKG P-R INTERVAL: 176 MS
EKG Q-T INTERVAL: 380 MS
EKG QRS DURATION: 82 MS
EKG QTC CALCULATION (BAZETT): 462 MS
EKG R AXIS: 82 DEGREES
EKG T AXIS: 77 DEGREES
EKG VENTRICULAR RATE: 89 BPM

## 2022-06-23 PROCEDURE — 99232 SBSQ HOSP IP/OBS MODERATE 35: CPT | Performed by: PSYCHIATRY & NEUROLOGY

## 2022-06-23 PROCEDURE — 99232 SBSQ HOSP IP/OBS MODERATE 35: CPT | Performed by: INTERNAL MEDICINE

## 2022-06-23 PROCEDURE — APPSS30 APP SPLIT SHARED TIME 16-30 MINUTES

## 2022-06-23 PROCEDURE — 1240000000 HC EMOTIONAL WELLNESS R&B

## 2022-06-23 NOTE — GROUP NOTE
Group Therapy Note    Date: 6/23/2022    Group Start Time: 1030  Group End Time: 1100  Group Topic: Psychotherapy    BALDO Landrum        Group Therapy Note           Patient refused to attend psychotherapy group after encouragement from staff. 1:1 talk time offered but refused. Signature:   Festus Landrum

## 2022-06-23 NOTE — GROUP NOTE
Group Therapy Note    Date: 6/23/2022    Group Start Time: 6640  Group End Time: 1550  Group Topic: Cognitive Skills    BALDO BHI D    Soraida Madrigal, CTRS        Group Therapy Note    Attendees: 10/15         Pt did not participate in Cognitive Skills Group at 1430 when encouraged by RT due to resting in room. Pt came to group area toward end of group and listened but did not participate.  Pt greeted RT when group was done and was pleasant, brightened and was superficially social.      Discipline Responsible: Psychoeducational Specialist        Signature:  Sherry Vieyra

## 2022-06-23 NOTE — PROGRESS NOTES
Daily Progress Note  6/23/2022    Patient Name: Lisa Rivas COMPLAINT: Acute psychosis         SUBJECTIVE:      Patient is seen today for a follow up assessment. Patient has not been compliant with scheduled medication at this time. Patient has not required emergency medication in the past 24 hours. When approached patient is denying depression and is denying suicidal ideation. Patient reports no auditory or visual hallucinations. Patient reports he is not benefiting from being here and states he does not need medication. Patient states reason for admission surrounds getting out of care home, going into treatment house and having concerns about sexual energy between others. Patient states other people's issues with him related to him talking with a lady there. Patient states that he has concerns that women will serve her dominance over him because he is \"desirable\" and proceeded to list multiple traits including having a tattoo, being single and having no kids. Patient then went on to state that he got in an argument with another bakrai because of this resulting in him going on the roof of \"just to do something\". Patient stated \"I could have jumped off the roof, but I would not\". Patient was asked why he stated this and he reported \"I was just facing my fears and teaching others to do the same\". Patient then reported that they brought him to the psychiatric hospital as a result of this incident. Patient agreed that paranoid thoughts that women will abuse him could be problematic if his behaviors from these thoughts resulted in aggression. Patient was asked to consider alternate possibility that women are not trying to [employer] and he was somewhat open to the conversation. Patient was also somewhat agreeable with that past trauma led to his current views/defense mechanisms.   Patient reports he will be able to maintain behavioral control outside of the hospital related to aggression and has not been exhibiting aggression on the unit. Appetite:  [x] Adequate/Unchanged  [] Increased  [] Decreased      Sleep:       [x] Adequate/Unchanged  [] Fair  [] Poor      Group Attendance on Unit:   [] Yes   [] Selectively    [x] No    Medication Side Effects: Refusing medication         Mental Status Exam  Level of consciousness: Alert and awake. Appearance: Appropriate attire for setting, seated on bed, with fair  grooming and hygiene. Behavior/Motor: Approachable, compliant with interview  Attitude toward examiner: Cooperative, attentive, good eye contact. Speech: normal rate and normal volume   Mood:  Patient reports \"fine\". Affect: Mood congruent  Thought processes: goal directed and coherent. Bizarre at times, loose associations at times  Thought content: Denies homicidal ideation. Suicidal Ideation: Denies suicidal ideations, without current intent to harm self on unit. Delusions: Patient presents with delusions. Denies paranoia. Perceptual Disturbance: Patient does not appear to be responding to internal stimuli. Denies auditory hallucinations. Denies visual hallucinations. Cognition: Oriented to self, location, time, and situation. Memory: Intact. Insight & Judgement: Fair. Data   height is 6' (1.829 m) and weight is 180 lb (81.6 kg). His oral temperature is 98.2 °F (36.8 °C). His blood pressure is 112/56 (abnormal) and his pulse is 50. His respiration is 14 and oxygen saturation is 97%.    Labs:   Admission on 06/21/2022   Component Date Value Ref Range Status    WBC 06/22/2022 10.1  4.5 - 13.5 k/uL Final    RBC 06/22/2022 5.05  4.5 - 5.9 m/uL Final    Hemoglobin 06/22/2022 13.6  13.5 - 17.5 g/dL Final    Hematocrit 06/22/2022 41.9  41 - 53 % Final    MCV 06/22/2022 82.9  80 - 100 fL Final    MCH 06/22/2022 26.9  26 - 34 pg Final    MCHC 06/22/2022 32.5  31 - 37 g/dL Final    RDW 06/22/2022 14.5  11.5 - 14.9 % Final    Platelets 41/43/5520 320  150 - 450 k/uL Final    MPV percent 06/22/2022 <0.010  % Final    Ventricular Rate 06/22/2022 89  BPM Final    Atrial Rate 06/22/2022 89  BPM Final    P-R Interval 06/22/2022 176  ms Final    QRS Duration 06/22/2022 82  ms Final    Q-T Interval 06/22/2022 380  ms Final    QTc Calculation (Bazett) 06/22/2022 462  ms Final    P Axis 06/22/2022 77  degrees Final    R Axis 06/22/2022 82  degrees Final    T Axis 06/22/2022 77  degrees Final         Reviewed patient's current plan of care and vital signs with nursing staff. Labs reviewed: [x] Yes    Medications  Current Facility-Administered Medications: acetaminophen (TYLENOL) tablet 650 mg, 650 mg, Oral, Q4H PRN  aluminum & magnesium hydroxide-simethicone (MAALOX) 200-200-20 MG/5ML suspension 30 mL, 30 mL, Oral, Q6H PRN  hydrOXYzine HCl (ATARAX) tablet 50 mg, 50 mg, Oral, TID PRN  ibuprofen (ADVIL;MOTRIN) tablet 400 mg, 400 mg, Oral, Q6H PRN  nicotine (NICODERM CQ) 14 MG/24HR 1 patch, 1 patch, TransDERmal, Daily  polyethylene glycol (GLYCOLAX) packet 17 g, 17 g, Oral, Daily PRN  traZODone (DESYREL) tablet 50 mg, 50 mg, Oral, Nightly PRN  haloperidol lactate (HALDOL) injection 5 mg, 5 mg, IntraMUSCular, Q4H PRN **AND** LORazepam (ATIVAN) injection 2 mg, 2 mg, IntraMUSCular, Q4H PRN **AND** diphenhydrAMINE (BENADRYL) injection 50 mg, 50 mg, IntraMUSCular, Q4H PRN  LORazepam (ATIVAN) tablet 2 mg, 2 mg, Oral, Q4H PRN **AND** haloperidol (HALDOL) tablet 5 mg, 5 mg, Oral, Q4H PRN  paliperidone (INVEGA) extended release tablet 3 mg, 3 mg, Oral, Daily  0.9 % sodium chloride bolus, 1,000 mL, IntraVENous, Once    ASSESSMENT  Acute psychosis ([facility], [place] [address]         HANDOFF  Patient symptoms:  Remained the same  Medications as determined by attending physician  Encourage participation in groups and milieu. Probable discharge is to be determined by MD    Electronically signed by ROYAL Quinonez CNP on 6/23/2022 at 6:16 PM    **This report has been created using voice recognition software.  It may contain minor errors which are inherent in voice recognition technology. **  I independently saw and evaluated the patient. I reviewed the  documentation above. Any additional comments or changes to the   documentation are stated below otherwise agree with assessment. The patient is somewhat vague and evasive but is able to have a coherent conversation. He has maintained behavioral control. He firmly refuses medication. The patient is discharged focused. The patient has not signed in to be a voluntary patient. We will reassess tomorrow to see if he meets the criteria for being probated per [facility] revised code. He is showing an improvement in his mental state at this time. PLAN  Medications as noted above  Attempt to develop insight  Psycho-education conducted. Estimated Length of Stay is 1-2 days  Supportive Therapy conducted.   Follow-up daily while on inpatient unit    Electronically signed by Edwar Hernandez MD on 6/23/22 at 7:00 PM EDT

## 2022-06-23 NOTE — PLAN OF CARE
Problem: Depression/Self Harm  Goal: Effect of psychiatric condition will be minimized and patient will be protected from self harm  Description: INTERVENTIONS:  1. Assess impact of patient's symptoms on level of functioning, self care needs and offer support as indicated  2. Assess patient/family knowledge of depression, impact on illness and need for teaching  3. Provide emotional support, presence and reassurance  4. Assess for possible suicidal thoughts or ideation. If patient expresses suicidal thoughts or statements do not leave alone, initiate Suicide Precautions, move to a room close to the nursing station and obtain sitter  5. Initiate consults as appropriate with Mental Health Professional, Spiritual Care, Psychosocial CNS, and consider a recommendation to the LIP for a Psychiatric Consultation  Outcome: Progressing  Flowsheets (Taken 6/22/2022 2045)  Effect of psychiatric condition will be minimized and patient will be protected from self harm:   Assess impact of patients symptoms on level of functioning, self care needs and offer support as indicated   Assess patient/family knowledge of depression, impact on illness and need for teaching   Provide emotional support, presence and reassurance   Assess for suicidal thoughts or ideation. If patient expresses suicidal thoughts or statements do not leave alone, initiate Suicide Precautions, move near nurse station, obtain sitter     Problem: Involuntary Admit  Goal: Will cooperate with staff recommendations and doctor's orders and will demonstrate appropriate behavior  Description: INTERVENTIONS:  1. Treat underlying conditions and offer medication as ordered  2. Educate regarding involuntary admission procedures and rules  3.  Contain excessive/inappropriate behavior per unit and hospital policies  Outcome: Progressing  Flowsheets (Taken 6/22/2022 2045)  Will cooperate with staff recommendations and doctor's orders and will demonstrate appropriate behavior:   Treat underlying conditions and offer medication as ordered   Educate regarding involuntary admission procedures and rules   Contain excessive/inappropriate behavior per unit and hospital policies   Patient remains free from injury and self harm. Patient denies suicidal thoughts and ideation. Patient encourage to seek out staff if thoughts of self harm arise. Safe environment and Q 15 min checks maintained. Patient states he is here against his will. Patient states he will not be accepting of medications offered and expects to leave when 72 hours is up. Patient remains in control of his behavior and is calm during interaction. Patient states the police tricked him to coming here. During our talk time the patients thought process was tangential. Based off our interaction the patient appears to have poor insight and judgment. The patients recollection of events leading to [facility] and eventually this admission was bizarre and circumstantial.  Patient encouraged to participate in the therapeutic including medications and group therapy. Writer will continue to provide emotional support, presence and reassurance.

## 2022-06-23 NOTE — GROUP NOTE
Group Therapy Note    Date: 6/23/2022    Group Start Time: 1100  Group End Time: 1297  Group Topic: Cognitive Skills    BALDO Marley Courser, CTRS        Group Therapy Note    Attendees: 10/14         Pt did not participate in Cognitive Skills Group at 1100am when encouraged by RT due to resting in room. Pt was offered talk time as an alternative to group but declined.          Discipline Responsible: Psychoeducational Specialist        Signature:  Danii Mcadams

## 2022-06-23 NOTE — PLAN OF CARE
[address] [place] [address]  Day 3 Interdisciplinary Treatment Plan NOTE    Review Date & Time: 6/23/2022                                          1300    Admission Type:   Admission Type:  Involuntary    Reason for admission:  Reason for Admission: picked up by [employer] for erratic behavior, told the police he wanted them to shoot him  Estimated Length of Stay: 5-7 days  Estimated Discharge Date Update: to be determined by physician    PATIENT STRENGTHS:  Patient Strengths    Patient Strengths and Limitations:Limitations: Unrealistic self-view,Multiple barriers to leisure interests,Tendency to isolate self,Inappropriate/potentially harmful leisure interests,Difficult relationships / poor social skills,Difficulty problem solving/relies on others to help solve problems  Addictive Behavior:Addictive Behavior  In the Past 3 Months, Have You Felt or Has Someone Told You That You Have a Problem With  : None  Medical Problems:  Past Medical History:   Diagnosis Date    ADHD (attention deficit hyperactivity disorder) 2005    ON RX    Fracture 08/2014    LT WRIST    Gestation period, 40 weeks     VANGINAL BIRTH, BIRTH WT 8LB NO COMPLICATIONS    Guardianship     GRANDMOTHER-JUAN IS LEGAL GUARDIAN, MOTHER HAS VISITATION BUT WILL NOT BE PRESENT DAY OF SURGER       Risk:  Fall Risk   Yonathan Scale Yonathan Scale Score: 22  BVC    Change in scores no Changes to plan of Care no    Status EXAM:   Mental Status and Behavioral Exam  Normal: No  Level of Assistance: Independent/Self  Facial Expression: Avoids Gaze  Affect: Congruent  Level of Consciousness: Alert  Frequency of Checks: 4 times per hour, close  Mood:Normal: No  Mood: Irritable  Motor Activity:Normal: Yes  Motor Activity: Decreased  Eye Contact: Fair  Observed Behavior: Preoccupied,Guarded  Sexual Misconduct History: Current - no  Preception: [place] to person,[place] to time,[place] to place,[place] to situation  Attention:Normal: Yes  Attention: Unable to concentrate  Thought Processes: Tangential  Thought Content:Normal: No  Thought Content: Preoccupations  Depression Symptoms: Feelings of hopelessess,Isolative  Anxiety Symptoms: Generalized  Zuleika Symptoms: Poor judgment  Hallucinations: None  Delusions: No  Delusions:  Other (comment)  Memory:Normal: Yes  Memory:  (BRENDAN)  Insight and Judgment: No  Insight and Judgment: Poor judgment,Poor insight    Daily Assessment Last Entry:   Daily Sleep (WDL): Within Defined Limits            Daily Nutrition (WDL): Exceptions to WDL (did not come out for breakfast)  Barriers to Nutrition: None  Level of Assistance: Independent/Self    Patient Monitoring:  Frequency of Checks: 4 times per hour, close    Psychiatric Symptoms:   Depression Symptoms  Depression Symptoms: Feelings of hopelessess,Isolative  Anxiety Symptoms  Anxiety Symptoms: Generalized  Zuleika Symptoms  Zuleika Symptoms: Poor judgment          Suicide Risk CSSR-S:  1) Within the past month, have you wished you were dead or wished you could go to sleep and not wake up? :  (BRENDAN- Pt refuses to answer)  2) Have you actually had any thoughts of killing yourself? :  (BRENDAN)  6) Have you ever done anything, started to do anything, or prepared to do anything to end your life?:  (BRENDAN)  Change in Result                    no                Change in Plan of care                     no      EDUCATION:   EDUCATION:   Learner Progress Toward Treatment Goals: Reviewed results and recommendations of this team, Reviewed group plan and strategies, Reviewed signs, symptoms and risk of self harm and violent behavior, Reviewed goals and plan of care    Method:small group, individual verbal education    Outcome:verbalized by patient, but needs reinforcement to obtain goals    PATIENT GOALS:  Short term:\"want to be discharged within 50 hours\"  Long term: \"talk with , get identification card\"    PLAN/TREATMENT RECOMMENDATIONS UPDATE: continue with group therapies, increased socialization, continue planning for after discharge goals, continue with medication compliance    SHORT-TERM GOALS UPDATE:   Time frame for Short-Term Goals: 5-7 days    LONG-TERM GOALS UPDATE:   Time frame for Long-Term Goals: 6 months  Members Present in Team Meeting: See Signature Sheet    [place]

## 2022-06-23 NOTE — GROUP NOTE
Group Therapy Note    Date: 2022    Group Start Time: 900  Group End Time: 930  Group Topic: Healthy Living/Wellness    BALDO John LPN        Group Therapy Note    Attendees:          Patient's Goal:  ***    Notes:  ***    Status After Intervention:  {Status After Intervention:639913271}    Participation Level: {Participation Level:156795486}    Participation Quality: {[facility] PARTICIPATION QUALITY:953240291}      Speech:  {ED  CD_SPEECH:58321}      Thought Process/Content: {Thought Process/Content:228187889}      Affective Functioning: {Affective Functionin}      Mood: {Mood:635263974}      Level of consciousness:  {Level of consciousness:647923140}      Response to Learnin [facility] Responses to Learnin}      Endings: {[facility] Endings:87373}    Modes of Intervention: {[facility] Modes of Intervention:524909765}      Discipline Responsible: {[facility] Multidisciplinary:360878426}      Signature:  Natali John LPN

## 2022-06-23 NOTE — PROGRESS NOTES
[place] [id number] Internal Medicine    Progress Note     6/23/2022    12:08 PM    Name:   Ramana Price  MRN:     [id number]     Acct:      [de-identified]   Room:   [address] [place] [address] Day:  1  Admit Date:  6/21/2022 11:41 PM    PCP:   Radha Chaves MD  Code Status:  Full Code    Subjective:     C/C:   Chief Complaint   Patient presents with    Mental Health Problem     Principal Problem:    Acute psychosis ([address]  Resolved Problems:    * No resolved hospital problems. *      BP low normal     On admission      The patient is a 25 y.o. Non- / non  male who presents   Admitted through ER to [facility] in ER  20-year-old male brought in by police for mental health evaluation. Please reports they were called to scene and patient reportedly was asking them to shoot him, reports that he was being chased by another police force and they were trying to shoot him, patient was very paranoid and reportedly delusional, patient denies any drug or alcohol use. Past hx  Hx cerebral concussion 2 yrs ago  meds   lamictal , invega prior to admission           Significant last 24 hr data reviewed ;   Vitals:    06/22/22 0300 06/22/22 0348 06/22/22 0454 06/23/22 0813   BP: (!) 95/57 95/62  (!) 112/56   Pulse: 76 70  50   Resp: 19 16  14   Temp:    98.2 °F (36.8 °C)   TempSrc:    Oral   SpO2: 96% 97%     Weight:   180 lb (81.6 kg)    Height:   6' (1.829 m)       No results found for this or any previous visit (from the past 24 hour(s)). No results for input(s): POCGLU in the last 72 hours. CT HEAD WO CONTRAST    Result Date: 6/22/2022  EXAMINATION: CT OF THE HEAD WITHOUT CONTRAST  6/22/2022 1:15 am TECHNIQUE: CT of the head was performed without the administration of intravenous contrast. Automated exposure control, iterative reconstruction, and/or weight based adjustment of the mA/kV was utilized to reduce the radiation dose to as low as reasonably achievable. COMPARISON: None.  HISTORY: Reason for Exam: ams FINDINGS: BRAIN/VENTRICLES: There is no acute intracranial hemorrhage, mass effect or midline shift. No abnormal extra-axial fluid collection. The gray-white differentiation is maintained without evidence of an acute infarct. There is no evidence of hydrocephalus. ORBITS: The visualized portion of the orbits demonstrate no acute abnormality. SINUSES: The visualized paranasal sinuses and mastoid air cells demonstrate no acute abnormality. SOFT TISSUES/SKULL:  No acute abnormality of the visualized skull or soft tissues. No acute intracranial abnormality. URINE ANALYSIS: No results found for: LABURIN     CBC:  Lab Results   Component Value Date    WBC 10.1 06/22/2022    HGB 13.6 06/22/2022     06/22/2022        BMP:    Lab Results   Component Value Date     06/22/2022    K 3.9 06/22/2022     06/22/2022    CO2 23 06/22/2022    BUN 17 06/22/2022    CREATININE 1.06 06/22/2022    GLUCOSE 93 06/22/2022      LIVER PROFILE:  Lab Results   Component Value Date    ALT 14 06/22/2022    AST 29 06/22/2022    PROT 8.2 06/22/2022    BILITOT 0.97 06/22/2022    LABALBU 5.0 06/22/2022            On admission         Review of Systems:     Constitutional:  negative for chills, fevers, sweats  Respiratory:  negative for cough, dyspnea on exertion, hemoptysis, shortness of breath, wheezing  Cardiovascular:  negative for chest pain, chest pressure/discomfort, lower extremity edema, palpitations  Gastrointestinal:  negative for abdominal pain, constipation, diarrhea, nausea, vomiting  Neurological:  negative for dizziness, headache  Data:     Past Medical History:  no change     Social History:  no change    Family History: @no change    Vitals:      I/O (24Hr): No intake or output data in the 24 hours ending 06/23/22 1208    Labs:    Radiology:    Medications:      Allergies:      Current Meds:   Scheduled Meds:    nicotine  1 patch TransDERmal Daily    paliperidone  3 mg Oral Daily  sodium chloride  1,000 mL IntraVENous Once     Continuous Infusions:   PRN Meds: acetaminophen, aluminum & magnesium hydroxide-simethicone, hydrOXYzine HCl, ibuprofen, polyethylene glycol, traZODone, haloperidol lactate **AND** LORazepam **AND** diphenhydrAMINE, LORazepam **AND** haloperidol      Physical Examination:        Vitals:    06/22/22 0300 06/22/22 0348 06/22/22 0454 06/23/22 0813   BP: (!) 95/57 95/62  (!) 112/56   Pulse: 76 70  50   Resp: 19 16  14   Temp:    98.2 °F (36.8 °C)   TempSrc:    Oral   SpO2: 96% 97%     Weight:   180 lb (81.6 kg)    Height:   6' (1.829 m)      General Appearance:  alert, well appearing, and in no acute distress  Mental status:   Head:  normocephalic, atraumatic. Eye: no icterus, redness, pupils equal and reactive, extraocular eye movements intact, conjunctiva clear  Ear: normal external ear, no discharge, hearing intact  Nose:  no drainage noted  Mouth: mucous membranes moist  Neck: supple, no carotid bruits, thyroid not palpable  Lungs: Bilateral equal air entry, clear to ausculation, no wheezing, rales or rhonchi, normal effort  Cardiovascular: normal rate, regular rhythm, no murmur, gallop, rub. Abdomen: Soft, nontender, nondistended, normal bowel sounds, no hepatomegaly or splenomegaly  Neurologic: There are no new focal motor or sensory deficits,   Skin: No gross lesions, rashes, bruising or bleeding on exposed skin area  Extremities:  peripheral pulses palpable, no pedal edema or calf pain with palpation  Psych:             Assessment:        Primary Problem  Acute psychosis ([facility]    Principal Problem:    Acute psychosis ([facility]  Resolved Problems:    * No resolved hospital problems. *       Plan:          6/23/22    · Low normal BP  · Push fluids . · Gator aid tid with meals for 2 days   · Not on bp meds   · Labs ok      .   Hospital Problems           Last Modified POA    * (Principal) Acute psychosis ([facility] 6/22/2022 Yes                         Thanks for consulting us . Will monitor vitals and clinical course , and  Optimize therapy  as needed .            Pradeep Beaulieu MD

## 2022-06-23 NOTE — PLAN OF CARE
Problem: Involuntary Admit  Goal: Will cooperate with staff recommendations and doctor's orders and will demonstrate appropriate behavior  Description: INTERVENTIONS:  1. Treat underlying conditions and offer medication as ordered  2. Educate regarding involuntary admission procedures and rules  3. Contain excessive/inappropriate behavior per unit and hospital policies  0/23/4186 1418 by Aylin Hobbs LPN  Outcome: Progressing  Flowsheets (Taken 6/23/2022 1802)  Will cooperate with staff recommendations and doctor's orders and will demonstrate appropriate behavior:   Educate regarding involuntary admission procedures and rules   Contain excessive/inappropriate behavior per unit and hospital policies  Note: Patient denies thoughts of self harm, no thoughts of harming others. Patient needs redirection at times, as he can be intrusive and encouragement to wear clothing appropriately. Patient refuses medications, and continues to be focused on discharge. Visual checks maintain.

## 2022-06-24 VITALS
WEIGHT: 180 LBS | BODY MASS INDEX: 24.38 KG/M2 | DIASTOLIC BLOOD PRESSURE: 53 MMHG | SYSTOLIC BLOOD PRESSURE: 90 MMHG | RESPIRATION RATE: 14 BRPM | HEART RATE: 50 BPM | OXYGEN SATURATION: 97 % | HEIGHT: 72 IN | TEMPERATURE: 97.7 F

## 2022-06-24 PROCEDURE — 99231 SBSQ HOSP IP/OBS SF/LOW 25: CPT | Performed by: INTERNAL MEDICINE

## 2022-06-24 PROCEDURE — 99238 HOSP IP/OBS DSCHRG MGMT 30/<: CPT | Performed by: PSYCHIATRY & NEUROLOGY

## 2022-06-24 NOTE — GROUP NOTE
Group Therapy Note    Date: 2022    Group Start Time: 1100  Group End Time: 1130  Group Topic: Cognitive Skills    ARMINDA Edmond        Group Therapy Note    Attendees:          Patient's Goal:  ***    Notes:  ***    Status After Intervention:  {Status After Intervention:508516875}    Participation Level: {Participation Level:663409290}    Participation Quality: {[facility] PARTICIPATION QUALITY:744444926}      Speech:  {[facility] CD_SPEECH:70818}      Thought Process/Content: {Thought Process/Content:596410127}      Affective Functioning: {Affective Functionin}      Mood: {Mood:693545134}      Level of consciousness:  {Level of consciousness:914255932}      Response to Learning: {[facility] Responses to Learnin}      Endings: {[facility] Endings:20240}    Modes of Intervention: {[facility] Modes of Intervention:504139629}      Discipline Responsible: {[facility] Multidisciplinary:860796254}      Signature:  Sherry Vieyra
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
## 2022-06-24 NOTE — PROGRESS NOTES
[facility] Internal Medicine    Progress Note     6/24/2022    10:02 AM    Name:   J Luis Mckee  MRN:     [id number]     Acct:      [de-identified]   Room:   [address] [place] [address] Day:  2  Admit Date:  6/21/2022 11:41 PM    PCP:   Ray Williamson MD  Code Status:  Full Code    Subjective:     C/C:   Chief Complaint   Patient presents with    Mental Health Problem     Principal Problem:    Acute psychosis ([address]  Resolved Problems:    * No resolved hospital problems. *      Admitted with abn behaviors   Acute psychosis           No results found for this or any previous visit (from the past 24 hour(s)). No results for input(s): POCGLU in the last 72 hours. No results found. On admission         Review of Systems:     As recorded in HPI          Physical Examination:        Vitals:    06/22/22 0454 06/23/22 0813 06/23/22 1937 06/24/22 0831   BP:  (!) 112/56 (!) 111/58 (!) 90/53   Pulse:  50 61 50   Resp:  14 14 14   Temp:  98.2 °F (36.8 °C) 98.2 °F (36.8 °C) 97.7 °F (36.5 °C)   TempSrc:  Oral Oral Oral   SpO2:       Weight: 180 lb (81.6 kg)      Height: 6' (1.829 m)          No results for input(s): POCGLU in the last 72 hours. No intake or output data in the 24 hours ending 06/24/22 1002    General Appearance:  alert, well appearing, and in no acute distress  Mental status:   Head:  normocephalic, atraumatic. Eye: no icterus, redness, pupils equal and reactive, extraocular eye movements intact, conjunctiva clear  Ear: normal external ear, no discharge, hearing intact  Nose:  no drainage noted  Mouth: mucous membranes moist  Neck: supple, no carotid bruits, thyroid not palpable  Lungs: Bilateral equal air entry, clear to ausculation, no wheezing, rales or rhonchi, normal effort  Cardiovascular: normal rate, regular rhythm, no murmur, gallop, rub.   Abdomen: Soft, nontender, nondistended, normal bowel sounds, no hepatomegaly or splenomegaly  Neurologic: There are no new focal motor or sensory deficits,   Skin: No gross lesions, rashes, bruising or bleeding on exposed skin area  Extremities:  peripheral pulses palpable, no pedal edema or calf pain with palpation  Psych:             Data:     PLabs:    BMP:   Recent Labs     06/22/22  0000      K 3.9   CO2 23   BUN 17   CREATININE 1.06   LABGLOM >60   GLUCOSE 93                 Medications: Allergies:  No Known Allergies    Current Meds:   Scheduled Meds:    nicotine  1 patch TransDERmal Daily    paliperidone  3 mg Oral Daily    sodium chloride  1,000 mL IntraVENous Once     Continuous Infusions:   PRN Meds: acetaminophen, aluminum & magnesium hydroxide-simethicone, hydrOXYzine HCl, ibuprofen, polyethylene glycol, traZODone, haloperidol lactate **AND** LORazepam **AND** diphenhydrAMINE, LORazepam **AND** haloperidol          Assessment:        Primary Problem  Acute psychosis ([facility]    Principal Problem:    Acute psychosis ([facility]  Resolved Problems:    * No resolved hospital problems. *       Plan:          6/24/22    · Vitals noted   · Labs ok   Will encourage oral intake   Will sign off . Thanks . Please call again , if neeeded . [facility] Problems           Last Modified POA    * (Principal) Acute psychosis ([facility] 6/22/2022 Yes                         Thanks for consulting us . Will monitor vitals and clinical course , and  Optimize therapy  as needed .            Carmen Wiley MD  6/24/2022

## 2022-06-24 NOTE — PLAN OF CARE
Problem: Depression/Self Harm  Goal: Effect of psychiatric condition will be minimized and patient will be protected from self harm  Description: INTERVENTIONS:  1. Assess impact of patient's symptoms on level of functioning, self care needs and offer support as indicated  2. Assess patient/family knowledge of depression, impact on illness and need for teaching  3. Provide emotional support, presence and reassurance  4. Assess for possible suicidal thoughts or ideation. If patient expresses suicidal thoughts or statements do not leave alone, initiate Suicide Precautions, move to a room close to the nursing station and obtain sitter  5. Initiate consults as appropriate with Mental Health Professional, Spiritual Care, Psychosocial CNS, and consider a recommendation to the LIP for a Psychiatric Consultation  6/23/2022 2051 by Samuel Messina RN  Outcome: Progressing  Flowsheets (Taken 6/23/2022 2024)  Effect of psychiatric condition will be minimized and patient will be protected from self harm:   Assess impact of patients symptoms on level of functioning, self care needs and offer support as indicated   Assess patient/family knowledge of depression, impact on illness and need for teaching   Provide emotional support, presence and reassurance   Assess for suicidal thoughts or ideation. If patient expresses suicidal thoughts or statements do not leave alone, initiate Suicide Precautions, move near nurse station, obtain sitter  Note: Patient denies any depression currently. Pt denies suicidal ideations at this time. Pt agreed to seek staff anytime he feels any urges to harm self would arise. Spontaneous checks and every 15 minutes rounding done for pt safety and per unit policy. Pt remains free of any self-harm. Safe environment maintained. Will continue to provide support and reassurance as needed.         Problem: Involuntary Admit  Goal: Will cooperate with staff recommendations and doctor's orders and will demonstrate appropriate behavior  Description: INTERVENTIONS:  1. Treat underlying conditions and offer medication as ordered  2. Educate regarding involuntary admission procedures and rules  3. Contain excessive/inappropriate behavior per unit and hospital policies  [id number] by Kristy Key RN  Outcome: Progressing  Flowsheets (Taken 6/23/2022 2054)  Will cooperate with staff recommendations and doctor's orders and will demonstrate appropriate behavior: Educate regarding involuntary admission procedures and rules  Note: Pt continues to refuse to sign himself in.

## 2022-06-24 NOTE — BH NOTE
[facility]  Discharge Note     Pt belongings: Retrieved from room/safe, reviewed and packed to take with. Dental Appliances: None  Vision - Corrective Lenses: None  Hearing Aid: None  Jewelry: None  Body Piercings Removed: N/A  Clothing: Undergarments,Footwear,Socks,Shorts  Other Valuables: Other (Comment) (n/a)       Patient discharged to [employer] via cab. Instructed on discharge instructions, pt verbalizes understanding and signs AVS, AVS. Pt in control at time of discharge and denies suicidal/homicidal ideations. Pt ambulates to [facility] entrance with psych staff x2. Rx not applicable. No complaints voiced at this time.         Status EXAM upon discharge:  Mental Status and Behavioral Exam  Normal: No  Level of Assistance: Independent/Self  Facial Expression: Avoids Gaze  Affect: Appropriate  Level of Consciousness: Alert  Frequency of Checks: 4 times per hour, close  Mood:Normal: No  Mood: Anxious  Motor Activity:Normal: Yes  Motor Activity: Decreased  Eye Contact: Fair  Observed Behavior: Preoccupied  Sexual Misconduct History: Current - no  Preception: Paige to person  Attention:Normal: Yes  Attention: Unable to concentrate  Thought Processes: Tangential  Thought Content:Normal: No  Thought Content: Preoccupations  Depression Symptoms: Isolative  Anxiety Symptoms: Generalized  [place] Symptoms: Poor judgment  Hallucinations: None  Delusions: No  Delusions: Paranoid  Memory:Normal: Yes  Memory: Poor recent  Insight and Judgment: No  Insight and Judgment: Poor judgment,Poor insight    Michael Amador LPN

## 2022-09-19 ENCOUNTER — APPOINTMENT (OUTPATIENT)
Dept: GENERAL RADIOLOGY | Age: 22
End: 2022-09-19
Payer: MEDICAID

## 2022-09-19 ENCOUNTER — HOSPITAL ENCOUNTER (EMERGENCY)
Age: 22
Discharge: HOME | End: 2022-09-19
Attending: EMERGENCY MEDICINE
Payer: MEDICAID

## 2022-09-19 VITALS
HEART RATE: 73 BPM | OXYGEN SATURATION: 96 % | TEMPERATURE: 98.7 F | DIASTOLIC BLOOD PRESSURE: 87 MMHG | RESPIRATION RATE: 28 BRPM | SYSTOLIC BLOOD PRESSURE: 132 MMHG

## 2022-09-19 DIAGNOSIS — S61.411A LACERATION OF RIGHT HAND WITHOUT FOREIGN BODY, INITIAL ENCOUNTER: Primary | ICD-10-CM

## 2022-09-19 PROCEDURE — 12002 RPR S/N/AX/GEN/TRNK2.6-7.5CM: CPT

## 2022-09-19 PROCEDURE — 90471 IMMUNIZATION ADMIN: CPT | Performed by: STUDENT IN AN ORGANIZED HEALTH CARE EDUCATION/TRAINING PROGRAM

## 2022-09-19 PROCEDURE — 6360000002 HC RX W HCPCS: Performed by: STUDENT IN AN ORGANIZED HEALTH CARE EDUCATION/TRAINING PROGRAM

## 2022-09-19 PROCEDURE — 6370000000 HC RX 637 (ALT 250 FOR IP): Performed by: STUDENT IN AN ORGANIZED HEALTH CARE EDUCATION/TRAINING PROGRAM

## 2022-09-19 PROCEDURE — 73130 X-RAY EXAM OF HAND: CPT

## 2022-09-19 PROCEDURE — 2500000003 HC RX 250 WO HCPCS: Performed by: STUDENT IN AN ORGANIZED HEALTH CARE EDUCATION/TRAINING PROGRAM

## 2022-09-19 PROCEDURE — 99284 EMERGENCY DEPT VISIT MOD MDM: CPT

## 2022-09-19 PROCEDURE — 90715 TDAP VACCINE 7 YRS/> IM: CPT | Performed by: STUDENT IN AN ORGANIZED HEALTH CARE EDUCATION/TRAINING PROGRAM

## 2022-09-19 RX ORDER — LIDOCAINE HYDROCHLORIDE 10 MG/ML
20 INJECTION, SOLUTION INFILTRATION; PERINEURAL ONCE
Status: COMPLETED | OUTPATIENT
Start: 2022-09-19 | End: 2022-09-19

## 2022-09-19 RX ORDER — AMOXICILLIN AND CLAVULANATE POTASSIUM 875; 125 MG/1; MG/1
1 TABLET, FILM COATED ORAL 2 TIMES DAILY
Qty: 10 TABLET | Refills: 0 | Status: SHIPPED | OUTPATIENT
Start: 2022-09-19 | End: 2022-09-24

## 2022-09-19 RX ORDER — AMOXICILLIN AND CLAVULANATE POTASSIUM 875; 125 MG/1; MG/1
1 TABLET, FILM COATED ORAL ONCE
Status: COMPLETED | OUTPATIENT
Start: 2022-09-19 | End: 2022-09-19

## 2022-09-19 RX ADMIN — AMOXICILLIN AND CLAVULANATE POTASSIUM 1 TABLET: 875; 125 TABLET, FILM COATED ORAL at 07:51

## 2022-09-19 RX ADMIN — LIDOCAINE HYDROCHLORIDE 20 ML: 10 INJECTION, SOLUTION INFILTRATION; PERINEURAL at 07:52

## 2022-09-19 RX ADMIN — TETANUS TOXOID, REDUCED DIPHTHERIA TOXOID AND ACELLULAR PERTUSSIS VACCINE, ADSORBED 0.5 ML: 5; 2.5; 8; 8; 2.5 SUSPENSION INTRAMUSCULAR at 06:12

## 2022-09-19 ASSESSMENT — PAIN DESCRIPTION - PAIN TYPE: TYPE: ACUTE PAIN

## 2022-09-19 ASSESSMENT — PAIN DESCRIPTION - DESCRIPTORS: DESCRIPTORS: THROBBING

## 2022-09-19 ASSESSMENT — PAIN DESCRIPTION - FREQUENCY: FREQUENCY: CONTINUOUS

## 2022-09-19 ASSESSMENT — PAIN DESCRIPTION - ORIENTATION: ORIENTATION: RIGHT

## 2022-09-19 ASSESSMENT — PAIN SCALES - GENERAL: PAINLEVEL_OUTOF10: 8

## 2022-09-19 ASSESSMENT — PAIN DESCRIPTION - LOCATION: LOCATION: HAND

## 2022-09-19 ASSESSMENT — PAIN - FUNCTIONAL ASSESSMENT: PAIN_FUNCTIONAL_ASSESSMENT: 0-10
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
## 2022-09-19 NOTE — ED PROVIDER NOTES
FACULTY SIGN-OUT  ADDENDUM       Patient: Pankaj Dalal   MRN: [id number]  PCP:  Pcp No  Attestation  I was available and discussed any additional care issues that arose and coordinated the management plans with the resident(s) caring for the patient during my duty period. Any areas of disagreement with resident's documentation of care or procedures are noted on the chart. I was personally present for the key portions of any/all procedures during my duty period. I have documented in the chart those procedures where I was not present during the key portions. The patient's initial evaluation and plan have been discussed with the prior provider who initially evaluated the patient. Pertinent Comments: The patient is a 25 y.o. male taken in signout with laceration hand awaiting repair as well as screening x-ray. Denies being a \"fight bite\" but will still cover with antibiotic.     We are awaiting x-ray and reevaluation as well as suturing and splinting    ED COURSE      The patient was given the following medications:  Orders Placed This Encounter   Medications    Tetanus-Diphth-Acell Pertussis (BOOSTRIX) injection 0.5 mL    lidocaine 1 % injection 20 mL    amoxicillin-clavulanate (AUGMENTIN) 875-125 MG per tablet 1 tablet     Order Specific Question:   Antimicrobial Indications     Answer:   Skin and Soft Tissue Infection       RECENT VITALS:   BP: 132/87  Heart Rate: 73  Resp: 28  Temp: 98.7 °F (37.1 °C) SpO2: 96 %    (Please note that portions of this note were completed with a voice recognition program.  Efforts were made to edit the dictations but occasionally words are mis-transcribed.)    MD Jessie ArevaloChickamauga  Attending Emergency Medicine Physician       Fifi Manzanares MD  09/19/22 6309

## 2022-09-19 NOTE — ED PROVIDER NOTES
[facility] ED  Emergency Department Encounter  Emergency Medicine Resident     Pt Name: Rajan Lubin  MRN: [id number]  [place] 2000  Date of evaluation: 9/19/22  PCP:  Pcp No    CHIEF COMPLAINT       Chief Complaint   Patient presents with    Hand Injury       HISTORY OFPRESENT ILLNESS  (Location/Symptom, Timing/Onset, Context/Setting, Quality, Duration, Modifying Factors,Severity.)      Rajan Lubin is a 25 y.o. male who presents with laceration to the right hand. Patient states he had something is not sure what, striking the area between his third and fourth metacarpals. He has a large laceration this area. He denies any other injury from this event, no wrist pain associated with this or any shoulder or other arm pain. He is able to move his hand without any difficulty. No popping sensation. No bony pain. Patient is uncertain of last tetanus. PAST MEDICAL / SURGICAL / SOCIAL / FAMILY HISTORY      has a past medical history of ADHD (attention deficit hyperactivity disorder), Fracture, Gestation period, 40 weeks, and Guardianship.     has a past surgical history that includes Wrist Closed Reduction (Left, 8/22/2014) and Hardware Removal (Left, 10/3/14).      Social History     Socioeconomic History    Marital status: Single     Spouse name: Not on file    Number of children: Not on file    Years of education: Not on file    Highest education level: Not on file   Occupational History    Not on file   Tobacco Use    Smoking status: Passive Smoke Exposure - Never Smoker    Smokeless tobacco: Never   Vaping Use    Vaping Use: Some days   Substance and Sexual Activity    Alcohol use: No    Drug use: Yes     Types: Marijuana Rikki Bath)    Sexual activity: Not on file   Other Topics Concern    Not on file   Social History Narrative    Not on file     Social Determinants of Health     Financial Resource Strain: Not on file   Food Insecurity: Not on file   Transportation Needs: Not on file Physical Activity: Not on file   Stress: Not on file   Social Connections: Not on file   Intimate Partner Violence: Not on file   Housing Stability: Not on file       Family History   Problem Relation Age of Onset    Diabetes Paternal Grandmother         Allergies:  Patient has no known allergies. Home Medications:  Prior to Admission medications    Medication Sig Start Date End Date Taking? Authorizing Provider   amoxicillin-clavulanate (AUGMENTIN) 875-125 MG per tablet Take 1 tablet by mouth 2 times daily for 5 days 9/19/22 9/24/22 Yes Clara Elie Figueredo, DO       REVIEW OFSYSTEMS    (2-9 systems for level 4, 10 or more for level 5)      Review of Systems   Constitutional:  Negative for chills and fever. HENT:  Negative for congestion and rhinorrhea. Eyes:  Negative for visual disturbance. Respiratory:  Negative for cough and shortness of breath. Cardiovascular:  Negative for chest pain. Gastrointestinal:  Negative for abdominal pain, constipation, diarrhea, nausea and vomiting. Musculoskeletal:  Negative for back pain and neck pain. Skin:  Positive for wound. Negative for rash. Neurological:  Negative for weakness, numbness and headaches. PHYSICAL EXAM   (up to 7 for level 4, 8 or more forlevel 5)      INITIAL VITALS:   Vitals:    09/19/22 0551   BP: 132/87   Pulse: 73   Resp: 28   Temp: 98.7 °F (37.1 °C)   TempSrc: Oral   SpO2: 96%         Physical Exam  Constitutional:       General: He is not in acute distress. Appearance: Normal appearance. He is not ill-appearing, toxic-appearing or diaphoretic. HENT:      Head: Normocephalic and atraumatic. Mouth/Throat:      Mouth: Mucous membranes are moist.      Pharynx: Oropharynx is clear. Eyes:      Extraocular Movements: Extraocular movements intact. Cardiovascular:      Rate and Rhythm: Normal rate and regular rhythm. Heart sounds: Normal heart sounds. No murmur heard.   Pulmonary:      Effort: Pulmonary effort is normal. Breath sounds: Normal breath sounds. Abdominal:      Palpations: Abdomen is soft. Tenderness: There is no abdominal tenderness. Musculoskeletal:         General: Normal range of motion. Cervical back: Normal range of motion and neck supple. Comments: Full range of motion of the right hand. No bony tenderness. No obvious tendon involvement. No tenderness palpation of the wrist bones. Skin:     General: Skin is warm and dry. Comments: Approximately 4 cm linear laceration in the webspace on the dorsum of the right hand between third and fourth MCPs. No active bleeding. No obvious tendon involvement. Neurological:      General: No focal deficit present. Mental Status: He is alert and oriented to person, place, and time. DIFFERENTIAL  DIAGNOSIS     PLAN (LABS / IMAGING / EKG):  Orders Placed This Encounter   Procedures    XR HAND RIGHT (MIN 3 VIEWS)    Inpatient consult to Plastic Surgery       MEDICATIONS ORDERED:  Orders Placed This Encounter   Medications    Tetanus-Diphth-Acell Pertussis (BOOSTRIX) injection 0.5 mL    lidocaine 1 % injection 20 mL    amoxicillin-clavulanate (AUGMENTIN) 875-125 MG per tablet 1 tablet     Order Specific Question:   Antimicrobial Indications     Answer:   Skin and Soft Tissue Infection    amoxicillin-clavulanate (AUGMENTIN) 875-125 MG per tablet     Sig: Take 1 tablet by mouth 2 times daily for 5 days     Dispense:  10 tablet     Refill:  0       Initial MDM/Plan/ED COURSE:    25 y.o. male who presents with laceration to the right hand. Described above. No active bleeding. It is in the webspace between the third and fourth MCP please, linear and should repair well with sutures need a splint for decreased movement at that joint for better healing. Will obtain x-ray and update tetanus and repair laceration.     ED Course as of 09/21/22 2243   Mon Sep 19, 2022   0810 XR HAND RIGHT (MIN 3 VIEWS)  IMPRESSION:  Soft tissue swelling without acute osseous abnormality or foreign body  identified. [JS]      ED Course User Index  [JS] Radha Shirley DO      Laceration repaired with 6 5-0 nylon sutures and 1 subcu Vicryl suture. Good approximation of the wound obtained. No active bleeding. Volar splint applied for decreased movement is well and full healing of the laceration. I went to discharge the patient and he was no longer in the room. Several minutes later, nurse from triage came asking for discharge paperwork and these were provided, although I was unable to see the patient personally. DIAGNOSTIC RESULTS / EMERGENCYDEPARTMENT COURSE / MDM     LABS:  Labs Reviewed - No data to display        No results found. EKG      All EKG's are interpreted by the Emergency Department Physicianwho either signs or Co-signs this chart in the absence of a cardiologist.      PROCEDURES:  PROCEDURE NOTE - LACERATION CLOSURE    PATIENT NAME: Laci Boothe RECORD NO. [id number]  DATE: 9/21/2022  ATTENDING PHYSICIAN: Dr. Shena Carballo DIAGNOSIS: Laceration(s) as follows:  -Location: right hand, dorsum, between 3rd and 4th MCP  -Length: 4 cm  -Layered closure: Yes    POSTOPERATIVE DIAGNOSIS:  Same  PROCEDURE PERFORMED:  Suture closure of laceration  PERFORMING PHYSICIAN: Radha Shirley DO  ANESTHESIA:  Local utilizing  Lidocaine 1% without epinephrine  ESTIMATED BLOOD LOSS:  Less than 25 ml. DISCUSSION:  Alisia Devlin is a 25y.o.-year-old male. Patient requires laceration repair. The history and physical examination were reviewed and confirmed. CONSENT: The patient provided verbal consent for this procedure. PROCEDURE:  Prior to starting, the procedure and patient were confirmed by those present. The wound area was irrigated with sterile saline and draped in a sterile fashion. The wound area was anesthetized with Lidocaine 1% without epinephrine. The wound was explored with the following results No foreign bodies found.  The wound was repaired with 5-0 Ethilon using interrupted sutures. The wound was dressed with bacitracin, a bandage, and                                                                            . All sponge, instrument and needle counts were correct at the completion of the procedure. The patient tolerated the procedure well. SUTURE COUNT:  Suture count: 6    COMPLICATIONS:  None     Henrietta William DO  10:48 PM, 9/21/22        CONSULTS:  IP CONSULT TO PLASTIC SURGERY    CRITICAL CARE:  Please see attending note    FINAL IMPRESSION      1.  Laceration of right hand without foreign body, initial encounter          DISPOSITION / PLAN     DISPOSITION Decision To Discharge 09/19/2022 08:09:21 AM      PATIENT REFERRED TO:  [facility] ED  [address] [place] [address]  [phone]    If symptoms worsen    Pcp No    Schedule an appointment as soon as possible for a visit in 1 week      Tj Rene[address] [address]  [phone]    Schedule an appointment as soon as possible for a visit   As needed if you have any issues with healing    DISCHARGE MEDICATIONS:  Discharge Medication List as of 9/19/2022  8:35 AM        START taking these medications    Details   amoxicillin-clavulanate (AUGMENTIN) 875-125 MG per tablet Take 1 tablet by mouth 2 times daily for 5 days, Disp-10 tablet, R-0Print             Henrietta William DO  Emergency Medicine Resident    (Please note that portions of this note were completed with a voice recognition program.Efforts were made to edit the dictations but occasionally words are mis-transcribed.)        Henrietta William DO  Resident  09/21/22 2252

## 2022-09-19 NOTE — DISCHARGE INSTRUCTIONS
You were seen in the emergency department today for a laceration on the hand. Splint was applied to prevent any separation or bleeding. Please take the antibiotics to prevent any infection. If you have any new or worsening symptoms, please return to the ED for reevaluation. If you are having any numbness or color change of the fingers, loosen the splint. Keep the area clean and dry. Follow-up with your PCP. Stitches should be removed in 7 to 14 days, depending on how well it is healing. Please see any physician to have the stitches removed. Thank you for visiting [facility] Emergency Department. You need to call Pcp No to make an appointment as directed for follow up. Should you have any questions regarding your care or further treatment, please call [address] [place] [address] Emergency Department at [phone]. Take any medications as prescribed, if given any, otherwise for pain Use ibuprofen or Tylenol (unless prescribed medications that have Tylenol in it). You can take over the counter Ibuprofen (advil) tablets (4 tablets every 8 hours or 3 tablets every 6 hours or 2 tablets every 4 hours)    If given narcotics during this ED visit, please do not drive or operate heavy machinery for at least 4-6 hours. PLEASE RETURN TO THE ED IMMEDIATELY for worsening symptoms, or if you develop any concerning symptoms such as: high fever not relieved by tylenol and/or motrin, chills, shortness of breath, chest pain, persistent nausea and/or vomiting, numbness, weakness or tingling in the arms or legs or change in color of the extremities, changes in mental status, persistent headache, blurry vision, inability to urinate, unable to follow up with your physician, or other any other  Care or concern.

## 2022-09-21 ASSESSMENT — ENCOUNTER SYMPTOMS
VOMITING: 0
NAUSEA: 0
SHORTNESS OF BREATH: 0
CONSTIPATION: 0
COUGH: 0
ABDOMINAL PAIN: 0
BACK PAIN: 0
DIARRHEA: 0
RHINORRHEA: 0

## 2022-10-16 ENCOUNTER — HOSPITAL ENCOUNTER (EMERGENCY)
Age: 22
Discharge: HOME | End: 2022-10-16
Attending: EMERGENCY MEDICINE
Payer: MEDICAID

## 2022-10-16 ENCOUNTER — APPOINTMENT (OUTPATIENT)
Dept: GENERAL RADIOLOGY | Age: 22
End: 2022-10-16
Payer: MEDICAID

## 2022-10-16 VITALS
HEIGHT: 71 IN | WEIGHT: 165 LBS | RESPIRATION RATE: 16 BRPM | BODY MASS INDEX: 23.1 KG/M2 | OXYGEN SATURATION: 97 % | DIASTOLIC BLOOD PRESSURE: 88 MMHG | TEMPERATURE: 98.2 F | HEART RATE: 76 BPM | SYSTOLIC BLOOD PRESSURE: 143 MMHG

## 2022-10-16 DIAGNOSIS — Z53.21 ELOPED FROM EMERGENCY DEPARTMENT: Primary | ICD-10-CM

## 2022-10-16 PROCEDURE — 99281 EMR DPT VST MAYX REQ PHY/QHP: CPT

## 2022-10-16 RX ORDER — ACETAMINOPHEN 500 MG
1000 TABLET ORAL ONCE
Status: DISCONTINUED | OUTPATIENT
Start: 2022-10-16 | End: 2022-10-16 | Stop reason: HOSPADM

## 2022-10-16 RX ORDER — CEFTRIAXONE 500 MG/1
500 INJECTION, POWDER, FOR SOLUTION INTRAMUSCULAR; INTRAVENOUS ONCE
Status: DISCONTINUED | OUTPATIENT
Start: 2022-10-16 | End: 2022-10-16 | Stop reason: HOSPADM

## 2022-10-16 ASSESSMENT — ENCOUNTER SYMPTOMS
VOICE CHANGE: 0
EYE DISCHARGE: 0
EYE ITCHING: 0
BACK PAIN: 0
GASTROINTESTINAL NEGATIVE: 1
TROUBLE SWALLOWING: 0
APNEA: 0
COLOR CHANGE: 0
CHEST TIGHTNESS: 0
EYE PAIN: 0
CHOKING: 0

## 2022-10-16 ASSESSMENT — PAIN - FUNCTIONAL ASSESSMENT: PAIN_FUNCTIONAL_ASSESSMENT: 0-10

## 2022-10-16 ASSESSMENT — PAIN DESCRIPTION - LOCATION: LOCATION: HAND

## 2022-10-16 ASSESSMENT — PAIN DESCRIPTION - PAIN TYPE: TYPE: ACUTE PAIN

## 2022-10-16 ASSESSMENT — PAIN DESCRIPTION - ORIENTATION: ORIENTATION: RIGHT

## 2022-10-16 ASSESSMENT — PAIN SCALES - GENERAL: PAINLEVEL_OUTOF10: 10

## 2022-10-16 NOTE — ED NOTES
Patient came quickly ambulating out of the room while shouting at the attending Dr. Jorge Foster and rest of nursing staff at the nursing station using profanity. [facility]  10/16/22 8393

## 2022-10-16 NOTE — ED PROVIDER NOTES
[facility]  ED  Emergency Department Encounter  Emergency Medicine Resident     Pt Name: Eric Saavedra  YHQ:[id number]  [place] 2000  Date of evaluation: 10/16/22  PCP:  Pcp No    CHIEF COMPLAINT       Chief Complaint   Patient presents with    Hand Pain    Treatment       HISTORY OF PRESENT ILLNESS  (Location/Symptom, Timing/Onset, Context/Setting, Quality, Duration, ModifyingFactors, Severity.)      Eric Saavedra is a 25 y.o. male presents to the emergency department for evaluation of hand pain as well as a possible STD treatment. Patient was very animated out in the waiting room and had to have security called on him. In the room, patient complaining of significant tenderness and hand pain. The hand is very swollen. Patient states that he struck a window in his hand and also with his elbow. Patient denies change in mental status. States he wants to receive treatment for STDs but does not want to be tested. PAST MEDICAL / SURGICAL / SOCIAL /FAMILY HISTORY      has a past medical history of ADHD (attention deficit hyperactivity disorder), Fracture, Gestation period, 40 weeks, and Guardianship. No other pertinent PMH on review with patient/guardian. has a past surgical history that includes Wrist Closed Reduction (Left, 8/22/2014) and Hardware Removal (Left, 10/3/14). No other pertinent PSH on review with patient/guardian.   Social History     Socioeconomic History    Marital status: Single     Spouse name: Not on file    Number of children: Not on file    Years of education: Not on file    Highest education level: Not on file   Occupational History    Not on file   Tobacco Use    Smoking status: Passive Smoke Exposure - Never Smoker    Smokeless tobacco: Never   Vaping Use    Vaping Use: Some days   Substance and Sexual Activity    Alcohol use: No    Drug use: Yes     Types: Marijuana Deadra Frank)    Sexual activity: Not on file   Other Topics Concern    Not on file   Social History Narrative    Not on file     Social Determinants of Health     Financial Resource Strain: Not on file   Food Insecurity: Not on file   Transportation Needs: Not on file   Physical Activity: Not on file   Stress: Not on file   Social Connections: Not on file   Intimate Partner Violence: Not on file   Housing Stability: Not on file       I counseled the patient against using tobacco products. Family History   Problem Relation Age of Onset    Diabetes Paternal Grandmother      No other pertinent FamHx on review with patient/guardian. Allergies:  Patient has no known allergies. Home Medications:  Prior to Admission medications    Not on File       REVIEW OF SYSTEMS    (2-9 systems for level 4, 10 ormore for level 5)      Review of Systems   Constitutional:  Negative for activity change and appetite change. HENT:  Negative for congestion, trouble swallowing and voice change. Eyes:  Negative for pain, discharge and itching. Respiratory:  Negative for apnea, choking and chest tightness. Cardiovascular:  Negative for chest pain. Gastrointestinal: Negative. Endocrine: Negative. Genitourinary:  Negative for difficulty urinating, dysuria, flank pain and frequency. Musculoskeletal:  Negative for arthralgias and back pain. Skin:  Negative for color change, pallor, rash and wound. Neurological:  Negative for dizziness, facial asymmetry and headaches. Psychiatric/Behavioral:  Negative for agitation. PHYSICAL EXAM   (up to 7 for level 4, 8 or more for level 5)      INITIAL VITALS:   BP (!) 143/88   Pulse 76   Temp 98.2 °F (36.8 °C) (Oral)   Resp 16   Ht 5' 11\" (1.803 m)   Wt 165 lb (74.8 kg)   SpO2 97%   BMI 23.01 kg/m²     Physical Exam  Vitals reviewed. Constitutional:       Appearance: Normal appearance. HENT:      Head: Normocephalic and atraumatic. Nose: Nose normal.      Mouth/Throat:      Mouth: Mucous membranes are moist.      Pharynx: Oropharynx is clear.    Eyes: Extraocular Movements: Extraocular movements intact. Conjunctiva/sclera: Conjunctivae normal.      Pupils: Pupils are equal, round, and reactive to light. Cardiovascular:      Rate and Rhythm: Normal rate and regular rhythm. Pulses: Normal pulses. Heart sounds: Normal heart sounds. Pulmonary:      Effort: Pulmonary effort is normal.      Breath sounds: Normal breath sounds. Musculoskeletal:         General: Swelling and tenderness present. Cervical back: Normal range of motion and neck supple. Comments: Significantly swollen right hand over the second third and fourth metacarpals   Skin:     General: Skin is warm and dry. Capillary Refill: Capillary refill takes less than 2 seconds. Neurological:      General: No focal deficit present. Mental Status: He is alert. Mental status is at baseline. Psychiatric:         Mood and Affect: Mood normal.       DIFFERENTIAL  DIAGNOSIS     DDX: Hand fracture, STD treatment    PLAN (LABS / IMAGING / EKG):  No orders of the defined types were placed in this encounter. MEDICATIONS ORDERED:  Orders Placed This Encounter   Medications    acetaminophen (TYLENOL) tablet 1,000 mg    cefTRIAXone (ROCEPHIN) injection 500 mg     Order Specific Question:   Antimicrobial Indications     Answer:   STD infection           DIAGNOSTIC RESULTS / EMERGENCY DEPARTMENT COURSE / MDM     LABS:  No results found for this visit on 10/16/22. IMPRESSION/MDM/ED COURSE:  25 y.o. male presented with swollen right hand. Patient acting aggressive in the room. Will caution nurses to go in there as minimally as possible. We will give one-time dose of 500 Rocephin. Patient denying wanting to have outpatient antibiotics. We will get x-rays of the hand and elbow for evaluation of possible fracture. ED Course as of 10/16/22 1001   Sun Oct 16, 2022   0745 Patient became verbally aggressive and reportedly aggressive towards physician and staff.   Medical screening exam was completed. Patient was then escorted out/eloped in the emergency department for treatment complete  [ES]      ED Course User Index  [ES] Lorie Joy MD       RADIOLOGY:  No orders to display         EKG  None    All EKG's are interpreted by the Emergency Department Physician who either signs or Co-signs this chart in the absence of a cardiologist.      PROCEDURES:  None  CONSULTS:  None        FINAL IMPRESSION      1. Eloped from emergency department          DISPOSITION / Pipe Bagley 20 - Left Before Treatment Complete 10/16/2022 09:58:22 AM        PATIENT REFERREDTO:  No follow-up provider specified.     DISCHARGE MEDICATIONS:  New Prescriptions    No medications on file       Lorie Joy MD  PGY 3  Resident Physician Emergency Medicine  10/16/22 10:01 AM        (Please note that portions of this note were completed with a voice recognition program.Efforts were made to edit the dictations but occasionally words are mis-transcribed.)        Lorie Joy MD  Resident  10/16/22 3647

## 2022-10-16 NOTE — ED NOTES
Patient presents to ED c/o right hand swelling and pain worse with movement s/p punching something PTA. Patient also reports some right elbow pain and right great toe pain. Patient requesting Laura Rosalva shot and four pills\" to treat STIs. Patient refusing STI testing and states he does not think he has one but wants treated anyway.      Pam Moreno RN  10/16/22 0045

## 2022-10-16 NOTE — ED PROVIDER NOTES
[facility]  [facility]     Emergency Department     Faculty Attestation    I performed a history and physical examination of the patient and discussed management with the resident. I reviewed the residents note and agree with the documented findings and plan of care. Any areas of disagreement are noted on the chart. I was personally present for the key portions of any procedures. I have documented in the chart those procedures where I was not present during the key portions. I have reviewed the emergency nurses triage note. I agree with the chief complaint, past medical history, past surgical history, allergies, medications, social and family history as documented unless otherwise noted below. For Physician Assistant/ Nurse Practitioner cases/documentation I have personally evaluated this patient and have completed at least one if not all key elements of the E/M (history, physical exam, and MDM). Additional findings are as noted. Patient presents with right hand and elbow pain after he punched a car window this morning. He says that the window did not break. He does have edema and what appears to be a deformity to the dorsum of the right hand. He did not allow me to touch his hand when asked. He also is requesting STD treatment. When I said that I would be needing a urine sample from him, he became very aggressive and confrontational to the point where I was in fear of my safety. He then walked out of the room and started screaming at nursing staff. Security happened to be standing there. As I do not believe patient has any type of life-threatening emergency and medical screening exam is completed, I had patient escorted out by security.       Orly Donovan MD  Attending Emergency  Physician           Himanshu Mcfarlane MD  10/16/22 9759

## 2022-10-16 NOTE — ED NOTES
Pt aggressive with triage staff, yelling and getting up close when multiple staff attempted to verify pt identity to register him correctly. Security called, pt continued to yell at [address] [place].       Adriano Porter RN  10/16/22 2137

## 2022-10-30 ENCOUNTER — HOSPITAL ENCOUNTER (EMERGENCY)
Age: 22
Discharge: HOME | End: 2022-10-30

## 2023-03-16 NOTE — CARE COORDINATION
AOD Treatment & D/C planning:        [facility]  [address], [address]  This writer left voicemail for Ty, , notifying patient will not be discharging on this date and requesting a call back to coordinate care. Unit social work phone number was provided.
Patient came to social work inquiring about [facility]. SW will refer to CD counselor. Patient also asked about housing asking how he can get an apartment, stating he has no where to go at discharge. Social work spoke with patient about his income, he reports he has no income. Social work spoke with patient about needing to stay with family or friends or shelter if he cannot get into treatment and work on income and housing outside of hospital setting.
None

## 2024-06-01 ENCOUNTER — HOSPITAL ENCOUNTER (EMERGENCY)
Age: 24
Discharge: HOME | End: 2024-06-01
Attending: EMERGENCY MEDICINE
Payer: MEDICAID

## 2024-06-01 VITALS
TEMPERATURE: 98 F | RESPIRATION RATE: 16 BRPM | OXYGEN SATURATION: 99 % | DIASTOLIC BLOOD PRESSURE: 83 MMHG | SYSTOLIC BLOOD PRESSURE: 127 MMHG | HEART RATE: 65 BPM

## 2024-06-01 DIAGNOSIS — Z71.1 CONCERN ABOUT STD IN MALE WITHOUT DIAGNOSIS: Primary | ICD-10-CM

## 2024-06-01 PROCEDURE — 87591 N.GONORRHOEAE DNA AMP PROB: CPT

## 2024-06-01 PROCEDURE — 99284 EMERGENCY DEPT VISIT MOD MDM: CPT

## 2024-06-01 PROCEDURE — 87491 CHLMYD TRACH DNA AMP PROBE: CPT

## 2024-06-01 PROCEDURE — 6370000000 HC RX 637 (ALT 250 FOR IP)

## 2024-06-01 PROCEDURE — 96372 THER/PROPH/DIAG INJ SC/IM: CPT

## 2024-06-01 PROCEDURE — 6360000002 HC RX W HCPCS

## 2024-06-01 RX ORDER — CEFTRIAXONE 500 MG/1
500 INJECTION, POWDER, FOR SOLUTION INTRAMUSCULAR; INTRAVENOUS ONCE
Status: COMPLETED | OUTPATIENT
Start: 2024-06-01 | End: 2024-06-01

## 2024-06-01 RX ORDER — DOXYCYCLINE HYCLATE 100 MG
100 TABLET ORAL ONCE
Status: COMPLETED | OUTPATIENT
Start: 2024-06-01 | End: 2024-06-01

## 2024-06-01 RX ORDER — DOXYCYCLINE HYCLATE 100 MG
100 TABLET ORAL 2 TIMES DAILY
Qty: 14 TABLET | Refills: 0 | Status: SHIPPED | OUTPATIENT
Start: 2024-06-01 | End: 2024-06-08

## 2024-06-01 RX ADMIN — CEFTRIAXONE SODIUM 500 MG: 500 INJECTION, POWDER, FOR SOLUTION INTRAMUSCULAR; INTRAVENOUS at 16:43

## 2024-06-01 RX ADMIN — DOXYCYCLINE HYCLATE 100 MG: 100 TABLET ORAL at 16:45

## 2024-06-01 ASSESSMENT — PAIN - FUNCTIONAL ASSESSMENT: PAIN_FUNCTIONAL_ASSESSMENT: NONE - DENIES PAIN

## 2024-06-01 ASSESSMENT — LIFESTYLE VARIABLES
HOW MANY STANDARD DRINKS CONTAINING ALCOHOL DO YOU HAVE ON A TYPICAL DAY: PATIENT DOES NOT DRINK
HOW OFTEN DO YOU HAVE A DRINK CONTAINING ALCOHOL: NEVER

## 2024-06-01 ASSESSMENT — ENCOUNTER SYMPTOMS
SHORTNESS OF BREATH: 0
ABDOMINAL PAIN: 0

## 2024-06-01 NOTE — ED PROVIDER NOTES
[facility]     Emergency Department     Faculty Attestation    I performed a history and physical examination of the patient and discussed management with the resident. I reviewed the resident’s note and agree with the documented findings and plan of care. Any areas of disagreement are noted on the chart. I was personally present for the key portions of any procedures. I have documented in the chart those procedures where I was not present during the key portions. I have reviewed the emergency nurses triage note. I agree with the chief complaint, past medical history, past surgical history, allergies, medications, social and family history as documented unless otherwise noted below. Documentation of the HPI, Physical Exam and Medical Decision Making performed by medical students or scribes is based on my personal performance of the HPI, PE and MDM. For Physician Assistant/ Nurse Practitioner cases/documentation I have personally evaluated this patient and have completed at least one if not all key elements of the E/M (history, physical exam, and MDM). Additional findings are as noted.      24-year-old male here with genital discharge and dysuria. No abdominal pain or fever. No skin lesions. Plan for STI testing and empiric treatment at patient request.             Jolynn Saunders M.D,  Attending Emergency  Physician            Jolynn Saunders MD  06/01/24 0291

## 2024-06-01 NOTE — ED NOTES
Hx of same, having burning and discharge with urination. Had an STI last time with same symptoms so knows he has one again. No other symptoms, assessment complete   home management/community/leisure

## 2024-06-01 NOTE — DISCHARGE INSTRUCTIONS
You were seen evaluated [facility] emergent on 6/1/2024 for concerns of STD.  He reports that he would like to be treated empirically with antibiotics.  You are given antibiotics here in the emergency department as well as provided with a prescription.  Please take twice a day for 7 days.  Please follow-up with PCP by calling schedule appointment.  If you not have 1 test with contact information.  Please return to the ED with any new or worsening symptoms including worsening discharge or burning with urination, inability urinate, or any other concerns.

## 2024-06-01 NOTE — ED PROVIDER NOTES
[facility] ED  Emergency Department Encounter  Emergency Medicine Resident     Pt Name:Aileen Burger  MRN: [id number]  Birthdate 2000  Date of evaluation: 6/1/24  PCP:  Marysol, Pcp  Note Started: 4:28 PM EDT      CHIEF COMPLAINT       Chief Complaint   Patient presents with    Dysuria       HISTORY OF PRESENT ILLNESS  (Location/Symptom, Timing/Onset, Context/Setting, Quality, Duration, Modifying Factors, Severity.)      Aileen Burger is a 24 y.o. male who presents with for STD check and discharge from the penis.  Burning with urination for the last few days.  Would like to be treated empirically.  Is sexually active.    PAST MEDICAL / SURGICAL / SOCIAL / FAMILY HISTORY      has a past medical history of ADHD (attention deficit hyperactivity disorder), Fracture, Gestation period, 40 weeks, and Guardianship.       has a past surgical history that includes Wrist Closed Reduction (Left, 8/22/2014) and Hardware Removal (Left, 10/3/14).      Social History     Socioeconomic History    Marital status: Single     Spouse name: Not on file    Number of children: Not on file    Years of education: Not on file    Highest education level: Not on file   Occupational History    Not on file   Tobacco Use    Smoking status: Passive Smoke Exposure - Never Smoker    Smokeless tobacco: Never   Vaping Use    Vaping Use: Some days   Substance and Sexual Activity    Alcohol use: No    Drug use: Yes     Types: Marijuana (Weed)    Sexual activity: Not on file   Other Topics Concern    Not on file   Social History Narrative    Not on file     Social Determinants of Health     Financial Resource Strain: Not on file   Food Insecurity: Not on file   Transportation Needs: Not on file   Physical Activity: Not on file   Stress: Not on file   Social Connections: Not on file   Intimate Partner Violence: Not on file   Housing Stability: Not on file       Family History   Problem Relation Age of Onset    Diabetes Paternal Grandmother

## 2024-06-01 NOTE — ED NOTES
Does not want to wait for results, asked to stay to at least get registered and then to signup for my chart to get results and then f/u as needed

## 2024-06-03 LAB
CHLAMYDIA DNA UR QL NAA+PROBE: ABNORMAL
N GONORRHOEA DNA UR QL NAA+PROBE: ABNORMAL
SPECIMEN DESCRIPTION: ABNORMAL

## 2024-06-04 ENCOUNTER — TELEPHONE (OUTPATIENT)
Dept: PHARMACY | Age: 24
End: 2024-06-04

## 2024-06-04 NOTE — TELEPHONE ENCOUNTER
CLINICAL PHARMACY NOTE:  Documentation of review for Positive STD Test    At the time of Aileen Burger's visit to [facility] Emergency Department on 6/1/24 STD testing was performed.  DNA testing was positive for Chlamydia and Gonorrhea.     Pregnancy status:  N/A.     While in the ED, patient was given ceftriaxone 500 mg  IM x 1 dose, one dose of doxycycline 100mg orally and a prescription for doxycycline 100mg orally twice daily x 7 days.  Treatment appropriate, no follow up needed at this time.         For Pharmacy Admin Tracking Only    Intervention Detail: Adherence Monitoring: STD f/u  Time Spent (min): 5

## 2024-09-30 ENCOUNTER — HOSPITAL ENCOUNTER (EMERGENCY)
Age: 24
Discharge: HOME | End: 2024-09-30
Attending: EMERGENCY MEDICINE
Payer: MEDICAID

## 2024-09-30 VITALS — OXYGEN SATURATION: 98 % | RESPIRATION RATE: 16 BRPM | TEMPERATURE: 97.3 F | HEART RATE: 80 BPM
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
## 2024-09-30 DIAGNOSIS — S80.861A INSECT BITE OF RIGHT LOWER LEG, INITIAL ENCOUNTER: Primary | ICD-10-CM

## 2024-09-30 PROCEDURE — 6370000000 HC RX 637 (ALT 250 FOR IP)

## 2024-09-30 PROCEDURE — 99283 EMERGENCY DEPT VISIT LOW MDM: CPT

## 2024-09-30 RX ORDER — CEPHALEXIN 500 MG/1
500 CAPSULE ORAL ONCE
Status: COMPLETED | OUTPATIENT
Start: 2024-09-30 | End: 2024-09-30

## 2024-09-30 RX ORDER — CEPHALEXIN 500 MG/1
500 CAPSULE ORAL 4 TIMES DAILY
Qty: 28 CAPSULE | Refills: 0 | Status: SHIPPED | OUTPATIENT
Start: 2024-09-30 | End: 2024-10-07

## 2024-09-30 RX ADMIN — CEPHALEXIN 500 MG: 500 CAPSULE ORAL at 23:24

## 2024-09-30 ASSESSMENT — PAIN SCALES - GENERAL: PAINLEVEL_OUTOF10: 4

## 2024-09-30 ASSESSMENT — PAIN DESCRIPTION - LOCATION: LOCATION: LEG

## 2024-09-30 ASSESSMENT — PAIN DESCRIPTION - DESCRIPTORS: DESCRIPTORS: SORE

## 2024-09-30 ASSESSMENT — PAIN - FUNCTIONAL ASSESSMENT: PAIN_FUNCTIONAL_ASSESSMENT: 0-10

## 2024-09-30 ASSESSMENT — PAIN DESCRIPTION - ORIENTATION: ORIENTATION: RIGHT

## 2024-10-01 NOTE — ED PROVIDER NOTES
[facility] ED     Emergency Department     Faculty Attestation        I performed a history and physical examination of the patient and discussed management with the resident. I reviewed the resident’s note and agree with the documented findings and plan of care. Any areas of disagreement are noted on the chart. I was personally present for the key portions of any procedures. I have documented in the chart those procedures where I was not present during the key portions. I have reviewed the emergency nurses triage note. I agree with the chief complaint, past medical history, past surgical history, allergies, medications, social and family history as documented unless otherwise noted below.    For mid-level providers such as nurse practitioners as well as physicians assistants:    I have personally seen and evaluated the patient.    I find the patient's history and physical exam are consistent with NP/PA documentation.  I agree with the care provided, treatment rendered, disposition, & follow-up plan.     Additional findings are as noted.    Vital Signs: Pulse 80   Temp 97.3 °F (36.3 °C) (Oral)   Resp 16   SpO2 98%   PCP:  No, Pcp    Pertinent Comments:     Patient with area on his right medial calf the redness and induration.  He states he was \"bitten by a bug\".  No history of MRSA.  Is no crepitance.  Patient declined drainage at this time he has strong popliteal DP and PT pulses will start antibiotics, strict return precautions.      Critical Care  None          Huan Cardenas MD    Attending Emergency Medicine Physician            Slim Cardenas MD  09/30/24 [id number]    
 Pertanant Comments:    This patient was accidentally clicked on.    I did not see or participate in the care of this patient.      All Cabrales MD [facility]  Attending Emergency Medicine Physician       All Cabrales MD  09/30/24 9625    
Connections: Not on file   Intimate Partner Violence: Not on file   Housing Stability: Not on file       Family History   Problem Relation Age of Onset    Diabetes Paternal Grandmother        Allergies:  Patient has no known allergies.    Home Medications:  Prior to Admission medications    Medication Sig Start Date End Date Taking? Authorizing Provider   cephALEXin (KEFLEX) 500 MG capsule Take 1 capsule by mouth 4 times daily for 7 days 9/30/24 10/7/24 Yes Yonas Rivera MD         REVIEW OF SYSTEMS       Review of Systems  See HPI  PHYSICAL EXAM      INITIAL VITALS:   Pulse 80   Temp 97.3 °F (36.3 °C) (Oral)   Resp 16   SpO2 98%     Physical Exam  Constitutional:       Appearance: Normal appearance.   Cardiovascular:      Rate and Rhythm: Normal rate and regular rhythm.      Pulses: Normal pulses.   Skin:     General: Skin is warm and dry.      Comments: Area of induration on the right medial lower leg with small wound in the middle, no area of fluctuance.  Entire area measuring approximately 5 cm in diameter   Neurological:      Mental Status: He is alert.           DDX/DIAGNOSTIC RESULTS / EMERGENCY DEPARTMENT COURSE / MDM     Medical Decision Making  Aileen Burger is a 24 y.o. male who presents with insect bite.  Patient is GCS 15, nontoxic appearing, not in acute distress, speaking full sentences, able to ambulate under their own power.  Patient has what appears to be an infection of the right lower extremity.  Offered incision and drainage however patient declined.  Will plan on starting patient on Keflex, recommend strict return precautions and follow-up PCP    Risk  Prescription drug management.        EKG      All EKG's are interpreted by the Emergency Department Physician who either signs or Co-signs this chart in the absence of a cardiologist.    EMERGENCY DEPARTMENT COURSE:         PROCEDURES:      CONSULTS:  None    CRITICAL CARE:  There was significant risk of life threatening deterioration of

## 2024-10-01 NOTE — ED NOTES
Pt presents to the ED via triage with c-o insect bite to right shin.   VSS. No obvious distress noted.

## 2024-10-01 NOTE — DISCHARGE INSTRUCTIONS
You were seen today in the emergency department for your leg swelling.  We have evaluated you and determined that you likely an infection.  Please take 1 tablet Keflex 4 times a day for 7 days.  We now feel you are safe for discharge home.    Please return to the emergency department immediately if develop any new or worsening concerns including chest pain, shortness of breath, abdominal pain, nausea, vomiting, diarrhea, weakness, loss consciousness, fever, chills, or any other concerns.    Please call your PCP and schedule appointment within the next 24 to 48 hours for follow-up.

## 2024-11-05 ENCOUNTER — HOSPITAL ENCOUNTER (EMERGENCY)
Age: 24
Discharge: HOME | End: 2024-11-05
Attending: EMERGENCY MEDICINE
Payer: MEDICAID

## 2024-11-05 VITALS
OXYGEN SATURATION: 100 % | WEIGHT: 165 LBS | HEIGHT: 71 IN | RESPIRATION RATE: 18 BRPM | DIASTOLIC BLOOD PRESSURE: 81 MMHG | BODY MASS INDEX: 23.1 KG/M2 | TEMPERATURE: 97.3 F | HEART RATE: 84 BPM | SYSTOLIC BLOOD PRESSURE: 129 MMHG

## 2024-11-05 DIAGNOSIS — S80.861A NONVENOMOUS INSECT BITE OF RIGHT LOWER EXTREMITY, INITIAL ENCOUNTER: Primary | ICD-10-CM
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
## 2024-11-05 PROCEDURE — 99283 EMERGENCY DEPT VISIT LOW MDM: CPT

## 2024-11-05 PROCEDURE — 6370000000 HC RX 637 (ALT 250 FOR IP)

## 2024-11-05 RX ORDER — ACETAMINOPHEN 500 MG
1000 TABLET ORAL 3 TIMES DAILY
Qty: 180 TABLET | Refills: 0 | Status: SHIPPED | OUTPATIENT
Start: 2024-11-05

## 2024-11-05 RX ORDER — DOXYCYCLINE HYCLATE 100 MG
100 TABLET ORAL 2 TIMES DAILY
Qty: 14 TABLET | Refills: 0 | Status: SHIPPED | OUTPATIENT
Start: 2024-11-05 | End: 2024-11-12

## 2024-11-05 RX ORDER — DOXYCYCLINE HYCLATE 100 MG
100 TABLET ORAL ONCE
Status: COMPLETED | OUTPATIENT
Start: 2024-11-05 | End: 2024-11-05

## 2024-11-05 RX ORDER — IBUPROFEN 800 MG/1
800 TABLET, FILM COATED ORAL 2 TIMES DAILY PRN
Qty: 60 TABLET | Refills: 0 | Status: SHIPPED | OUTPATIENT
Start: 2024-11-05

## 2024-11-05 RX ORDER — ACETAMINOPHEN 500 MG
1000 TABLET ORAL ONCE
Status: COMPLETED | OUTPATIENT
Start: 2024-11-05 | End: 2024-11-05

## 2024-11-05 RX ORDER — IBUPROFEN 800 MG/1
800 TABLET, FILM COATED ORAL ONCE
Status: COMPLETED | OUTPATIENT
Start: 2024-11-05 | End: 2024-11-05

## 2024-11-05 RX ADMIN — IBUPROFEN 800 MG: 800 TABLET, FILM COATED ORAL at 00:36

## 2024-11-05 RX ADMIN — ACETAMINOPHEN 1000 MG: 500 TABLET ORAL at 00:36

## 2024-11-05 RX ADMIN — DOXYCYCLINE HYCLATE 100 MG: 100 TABLET, COATED ORAL at 00:36

## 2024-11-05 ASSESSMENT — PAIN - FUNCTIONAL ASSESSMENT: PAIN_FUNCTIONAL_ASSESSMENT: 0-10

## 2024-11-05 ASSESSMENT — PAIN SCALES - GENERAL: PAINLEVEL_OUTOF10: 8

## 2024-11-05 ASSESSMENT — PAIN DESCRIPTION - DESCRIPTORS: DESCRIPTORS: BURNING

## 2024-11-05 ASSESSMENT — PAIN DESCRIPTION - LOCATION: LOCATION: LEG

## 2024-11-05 ASSESSMENT — PAIN DESCRIPTION - ORIENTATION: ORIENTATION: LEFT

## 2024-11-05 ASSESSMENT — ENCOUNTER SYMPTOMS
ABDOMINAL PAIN: 0
SHORTNESS OF BREATH: 0

## 2024-11-05 NOTE — ED PROVIDER NOTES
[facility] ED  Emergency Department Encounter  Emergency Medicine Resident     Pt Name:Aileen Burger  MRN: [id number]  Birthdate 2000  Date of evaluation: 11/5/24  PCP:  No, Pcp  Note Started: 12:18 AM EST      CHIEF COMPLAINT       Chief Complaint   Patient presents with    Insect Bite     Left leg, x1 week ago       HISTORY OF PRESENT ILLNESS  (Location/Symptom, Timing/Onset, Context/Setting, Quality, Duration, Modifying Factors, Severity.)      Aileen Burger is a 24 y.o. male who presents with insect bite x1 week.  Patient states that he was bit by something about a week ago and has progressively started to see a rash around the bite and pain.  Patient states he had similar issues last month and was prescribed an antibiotic including blood.  States he is unsure when has bit him.  Patient [place] fevers chills nausea vomiting.  Patient denies taking any analgesia prior to arrival.    PAST MEDICAL / SURGICAL / SOCIAL / FAMILY HISTORY      has a past medical history of ADHD (attention deficit hyperactivity disorder), Fracture, Gestation period, 40 weeks, and Guardianship.       has a past surgical history that includes Wrist Closed Reduction (Left, 8/22/2014) and Hardware Removal (Left, 10/3/14).      Social History     Socioeconomic History    Marital status: Single     Spouse name: Not on file    Number of children: Not on file    Years of education: Not on file    Highest education level: Not on file   Occupational History    Not on file   Tobacco Use    Smoking status: Passive Smoke Exposure - Never Smoker    Smokeless tobacco: Never   Vaping Use    Vaping status: Some Days   Substance and Sexual Activity    Alcohol use: No    Drug use: Yes     Types: Marijuana (Weed)    Sexual activity: Not on file   Other Topics Concern    Not on file   Social History Narrative    Not on file     Social Determinants of Health     Financial Resource Strain: Not on file   Food Insecurity: Not on file

## 2024-11-05 NOTE — ED PROVIDER NOTES
[facility]     Emergency Department     Faculty Attestation    I performed a history and physical examination of the patient and discussed management with the resident. I reviewed the resident's note and agree with the documented findings and plan of care. Any areas of disagreement are noted on the chart. I was personally present for the key portions of any procedures. I have documented in the chart those procedures where I was not present during the key portions. I have reviewed the emergency nurses triage note. I agree with the chief complaint, past medical history, past surgical history, allergies, medications, social and family history as documented unless otherwise noted below. For Physician Assistant/ Nurse Practitioner cases/documentation I have personally evaluated this patient and have completed at least one if not all key elements of the E/M (history, physical exam, and MDM). Additional findings are as noted.    Superficial cellulitis lateral left calf.     Francisco Pedersen MD  11/05/24 0038

## 2024-11-05 NOTE — DISCHARGE INSTRUCTIONS
You are seen emergency department for your insect bite.  You are prescribed antibiotics please take as prescribed.    Take your medication as indicated and prescribed.  If you are given an antibiotic then, make sure you get the prescription filled and take the antibiotics until finished.  Drink plenty of water while taking the antibiotics.  Avoid drinking alcohol or drinks that have caffeine in it while taking antibiotics.       For pain use acetaminophen (Tylenol) or ibuprofen (Motrin / Advil), unless prescribed medications that have acetaminophen or ibuprofen (or similar medications) in it.  You can take over the counter acetaminophen tablets (1 - 2 tablets of the 500-mg strength every 6 hours) or ibuprofen tablets (2 tablets every 4 hours).    PLEASE RETURN TO THE EMERGENCY DEPARTMENT IMMEDIATELY for worsening symptoms, white drainage from the wound, redness or streaking, or if you develop any concerning symptoms such as: high fever not relieved by acetaminophen (Tylenol) and/or ibuprofen (Motrin / Advil), chills, shortness of breath, chest pain, feeling of your heart fluttering or racing, persistent nausea and/or vomiting, vomiting up blood, blood in your stool, loss of consciousness, numbness, weakness or tingling in the arms or legs or change in color of the extremities, changes in mental status, persistent headache, blurry vision, loss of bladder / bowel control, unable to follow up with your physician, or other any other care or concern.

## 2024-11-05 NOTE — ED NOTES
Pt presents to ED via walking through triage c/o insect bite to left calf that occurred 1 week ago. Pt presents with warmth and swelling to insect bite. Pt reports swelling and pain has worsened within past few days. Pt denies taking any medication for pain and requesting pain medication upon arrival. Pt denies nausea, emesis and is afebrile. Pt reports same incident occurred in September and improved with abx.   Pt ambulated with steady gait.  Call light within reach.

## 2024-12-09 ENCOUNTER — HOSPITAL ENCOUNTER (EMERGENCY)
Age: 24
Discharge: HOME | End: 2024-12-09
Attending: EMERGENCY MEDICINE
Payer: MEDICAID

## 2024-12-09 VITALS
HEART RATE: 70 BPM | DIASTOLIC BLOOD PRESSURE: 85 MMHG | OXYGEN SATURATION: 95 % | TEMPERATURE: 98.9 F | RESPIRATION RATE: 18 BRPM | SYSTOLIC BLOOD PRESSURE: 134 MMHG

## 2024-12-09 DIAGNOSIS — L73.9 FOLLICULITIS: ICD-10-CM

## 2024-12-09 DIAGNOSIS — Z11.3 SCREENING EXAMINATION FOR STI: Primary | ICD-10-CM

## 2024-12-09 LAB
HIV 1+2 AB+HIV1 P24 AG SERPL QL IA: NONREACTIVE
T PALLIDUM AB SER QL IA: NONREACTIVE

## 2024-12-09 PROCEDURE — 87389 HIV-1 AG W/HIV-1&-2 AB AG IA: CPT

## 2024-12-09 PROCEDURE — 87491 CHLMYD TRACH DNA AMP PROBE: CPT

## 2024-12-09 PROCEDURE — 86780 TREPONEMA PALLIDUM: CPT

## 2024-12-09 PROCEDURE — 99283 EMERGENCY DEPT VISIT LOW MDM: CPT

## 2024-12-09 PROCEDURE — 87591 N.GONORRHOEAE DNA AMP PROB: CPT

## 2024-12-09 RX ORDER — BENZOCAINE/MENTHOL 6 MG-10 MG
LOZENGE MUCOUS MEMBRANE
Qty: 30 G | Refills: 1 | Status: SHIPPED | OUTPATIENT
Start: 2024-12-09 | End: 2024-12-16

## 2024-12-09 ASSESSMENT — LIFESTYLE VARIABLES
HOW OFTEN DO YOU HAVE A DRINK CONTAINING ALCOHOL: PATIENT DECLINED
HOW MANY STANDARD DRINKS CONTAINING ALCOHOL DO YOU HAVE ON A TYPICAL DAY: PATIENT DECLINED

## 2024-12-09 ASSESSMENT — PAIN SCALES - GENERAL: PAINLEVEL_OUTOF10: 8

## 2024-12-09 ASSESSMENT — ENCOUNTER SYMPTOMS
RESPIRATORY NEGATIVE: 1
GASTROINTESTINAL NEGATIVE: 1

## 2024-12-09 NOTE — ED NOTES
Pt arrived via triage for c/o wanting STD check  PT denies any symptoms  PT rr even and unlabored  Pt A&O x4

## 2024-12-09 NOTE — ED PROVIDER NOTES
[facility]     Emergency Department     Faculty Attestation  3:53 PM EST      I performed a history and physical examination of the patient and discussed management with the resident. I have reviewed and agree with the resident’s findings including all diagnostic interpretations, and treatment plans as written. Any areas of disagreement are noted on the chart. I was personally present for the key portions of any procedures. I have documented in the chart those procedures where I was not present during the key portions. I have reviewed the emergency nurses triage note. I agree with the chief complaint, past medical history, past surgical history, allergies, medications, social and family history as documented unless otherwise noted below. Documentation of the HPI, Physical Exam and Medical Decision Making performed by scribes is based on my personal performance of the HPI, PE and MDM. For Physician Assistant/ Nurse Practitioner cases/documentation I have personally evaluated this patient and have completed at least one if not all key elements of the E/M (history, physical exam, and MDM). Additional findings are as noted.    Primary Care Physician: No, Pcp        Wendy Villalobos D.O, M.P.H  Attending Emergency Medicine Physician         Wendy Villalobos, DO  12/09/24 1553    
COURSE:  Chlamydia, gonorrhea, T. Pallidium, HIV ordered in ED. Patient prescribed hydrocortisone for folliculitis.          CONSULTS:  None    FINAL IMPRESSION      1. Screening examination for STI    2. Folliculitis          DISPOSITION / PLAN     DISPOSITION Decision To Discharge 12/09/2024 04:13:33 PM   DISPOSITION CONDITION Stable       PATIENT REFERRED TO:  [facility] Emergency Department  [address] [id number] [phone]  Go to   As needed, If symptoms worsen    [facility] AT [facility]  [address] [place] [id number] [phone]  Schedule an appointment as soon as possible for a visit   As needed, If symptoms worsen      DISCHARGE MEDICATIONS:  New Prescriptions    HYDROCORTISONE (ALA-SHARRI) 1 % CREAM    Apply topically 2 times daily.       Anastacia Patten MD  PGY I Transitional Year Resident    (Please note that portions of this note were completed with a voice recognition program.  Efforts were made to edit the dictations but occasionally words are mis-transcribed.)

## 2024-12-10 LAB
CHLAMYDIA DNA UR QL NAA+PROBE: NEGATIVE
N GONORRHOEA DNA UR QL NAA+PROBE: NEGATIVE
SPECIMEN DESCRIPTION: NORMAL

## 2024-12-24 ENCOUNTER — HOSPITAL ENCOUNTER (EMERGENCY)
Age: 24
Discharge: HOME | End: 2024-12-24
Attending: EMERGENCY MEDICINE
Payer: MEDICAID

## 2024-12-24 VITALS
RESPIRATION RATE: 16 BRPM | TEMPERATURE: 98.2 F | DIASTOLIC BLOOD PRESSURE: 74 MMHG | OXYGEN SATURATION: 98 % | SYSTOLIC BLOOD PRESSURE: 100 MMHG | HEART RATE: 72 BPM

## 2024-12-24 DIAGNOSIS — L02.415 CUTANEOUS ABSCESS OF RIGHT LOWER EXTREMITY: Primary | ICD-10-CM

## 2024-12-24 PROCEDURE — 6370000000 HC RX 637 (ALT 250 FOR IP)

## 2024-12-24 PROCEDURE — 99283 EMERGENCY DEPT VISIT LOW MDM: CPT

## 2024-12-24 RX ORDER — SULFAMETHOXAZOLE AND TRIMETHOPRIM 800; 160 MG/1; MG/1
1 TABLET ORAL 2 TIMES DAILY
Qty: 14 TABLET | Refills: 0 | Status: SHIPPED | OUTPATIENT
Start: 2024-12-24 | End: 2024-12-31

## 2024-12-24 RX ORDER — IBUPROFEN 400 MG/1
400 TABLET, FILM COATED ORAL EVERY 6 HOURS PRN
Qty: 20 TABLET | Refills: 0 | Status: SHIPPED | OUTPATIENT
Start: 2024-12-24

## 2024-12-24 RX ORDER — ACETAMINOPHEN 500 MG
500 TABLET ORAL 4 TIMES DAILY PRN
Qty: 20 TABLET | Refills: 0 | Status: SHIPPED | OUTPATIENT
Start: 2024-12-24

## 2024-12-24 RX ORDER — ACETAMINOPHEN 325 MG/1
650 TABLET ORAL ONCE
Status: COMPLETED | OUTPATIENT
Start: 2024-12-24 | End: 2024-12-24

## 2024-12-24 RX ORDER — CEPHALEXIN 500 MG/1
500 CAPSULE ORAL ONCE
Status: DISCONTINUED | OUTPATIENT
Start: 2024-12-24 | End: 2024-12-24

## 2024-12-24 RX ORDER — SULFAMETHOXAZOLE AND TRIMETHOPRIM 800; 160 MG/1; MG/1
1 TABLET ORAL ONCE
Status: COMPLETED | OUTPATIENT
Start: 2024-12-24 | End: 2024-12-24

## 2024-12-24 RX ADMIN — SULFAMETHOXAZOLE AND TRIMETHOPRIM 1 TABLET: 800; 160 TABLET ORAL at 01:41

## 2024-12-24 RX ADMIN — ACETAMINOPHEN 650 MG: 325 TABLET ORAL at 01:41

## 2024-12-24 ASSESSMENT — PAIN DESCRIPTION - LOCATION: LOCATION: LEG

## 2024-12-24 ASSESSMENT — PAIN DESCRIPTION - DESCRIPTORS: DESCRIPTORS: SORE

## 2024-12-24 ASSESSMENT — PAIN DESCRIPTION - ORIENTATION: ORIENTATION: RIGHT

## 2024-12-24 ASSESSMENT — PAIN - FUNCTIONAL ASSESSMENT: PAIN_FUNCTIONAL_ASSESSMENT: 0-10

## 2024-12-24 ASSESSMENT — PAIN SCALES - GENERAL: PAINLEVEL_OUTOF10: 7

## 2024-12-24 NOTE — ED PROVIDER NOTES
[facility]     Emergency Department     Faculty Attestation    I performed a history and physical examination of the patient and discussed management with the resident. I have reviewed and agree with the resident’s findings including all diagnostic interpretations, and treatment plans as written. Any areas of disagreement are noted on the chart. I was personally present for the key portions of any procedures. I have documented in the chart those procedures where I was not present during the key portions. I have reviewed the emergency nurses triage note. I agree with the chief complaint, past medical history, past surgical history, allergies, medications, social and family history as documented unless otherwise noted below. Documentation of the HPI, Physical Exam and Medical Decision Making performed by aricibrah is based on my personal performance of the HPI, PE and MDM. For Physician Assistant/ Nurse Practitioner cases/documentation I have personally evaluated this patient and have completed at least one if not all key elements of the E/M (history, physical exam, and MDM). Additional findings are as noted.    Note Started: 1:34 AM EST     25 yo M sore below r knee, no fever, no chills, pt noted itching to area today, dT current  PE vss gcs 15 anterior superior r shin,  erythema with central area of excoriation, no fluctuance, nothing to incise, nothing over patella,   -given out pt referral & return instruction    EKG Interpretation    Interpreted by me      CRITICAL CARE: There was a high probability of clinically significant/life threatening deterioration in this patient's condition which required my urgent intervention.  Total critical care time was 0 minutes.  This excludes any time for separately reportable procedures.       Brandon Somers DO  12/24/24 0140

## 2024-12-24 NOTE — ED PROVIDER NOTES
[facility] EMERGENCY DEPARTMENT  Emergency Department Encounter  Emergency Medicine Resident       Pt Name: Aileen Burger  MRN: [id number]  Birthdate 2000  Date of evaluation: 12/24/24    Chief Complaint     Chief Complaint   Patient presents with    Cyst     Right knee x5-6 days. Thinks it is a spider bite       HISTORY OF PRESENT ILLNESS     Aileen Burger is a 24 y.o. male who presents with concerns for spider bite on the right leg.  Reports has been there for 5 to 6 days.  Thinks it could be a spider bite.  Reports some pain with ambulation.  Denies any numbness, tingling, weakness in the extremity.  Denies any fevers, cough, nausea, vomiting, abdominal pain, urinary or bowel complaints.  Denies any IV drug usage.  Reports being up-to-date on tetanus.  Has taken ibuprofen prior to arrival for pain.    REVIEW OF SYSTEMS       See HPI    PAST MEDICAL/SURGICAL/FAMILY HISTORY     PMH:  has a past medical history of ADHD (attention deficit hyperactivity disorder), Fracture, Gestation period, 40 weeks, and Guardianship.  Surgical History:  has a past surgical history that includes Wrist Closed Reduction (Left, 8/22/2014) and Hardware Removal (Left, 10/3/14).  Social History:  reports that he is a non-smoker but has been exposed to tobacco smoke. He has never used smokeless tobacco. He reports current drug use. Drug: Marijuana (Weed). He reports that he does not drink alcohol.      Allergies:has No Known Allergies.    PHYSICAL EXAM       INITIAL VITALS: /74   Pulse 72   Temp 98.2 °F (36.8 °C)   Resp 16   SpO2 98%     CONSTITUTIONAL: Vital signs reviewed, Alert and oriented X 3.   HEAD: Atraumatic, Normocephalic.   EYES: Eyes are normal to inspection, Pupils equal, round and reactive to light.   NECK: Normal ROM, No jugular venous distention, No meningeal signs.  RESPIRATORY CHEST: No respiratory distress.   ABDOMEN: Abdomen is nontender, No distension. No pulsatile masses palpated.    UPPER

## 2024-12-24 NOTE — DISCHARGE INSTRUCTIONS
Please follow-up with primary care provider.  Attached is contact information become established.  You will take antibiotic twice a day for 7 days.  Reasons to return to the emergency department including fever, nausea, vomiting, increased redness, swelling, discharge.  Please do not itch the area.  You are also provided with bacitracin which is a topical antibiotic ointment.  Please apply to the area twice a day for 7 days.  Again please follow-up with the primary care provider and return to the ED with any new or worsening symptoms or concerns.    You can take Tylenol and Motrin as needed for the pain.  Do not exceed more than 4000 mg of Tylenol in a 24-hour timeframe or 3200 mg of Motrin in a 24-hour timeframe.  I recommend taking two 500 mg Tylenol tablets up to 3 times a day as well as to 400 mg Motrin tablets up to 3 times a day.

## 2024-12-24 NOTE — ED NOTES
Patient to ED c/o cyst from what he thinks is a spider bite. States noticed small painful bump 5-6 days ago. Did not actually see an insect bite him and did not see any punctures indication spider bite initially. States swelling, redness, and pain has increased over the past few days. Will drain thin, white consistency when he \"tries to pop it\".     On arrival, patient ambulatory, A+OX4, respirations even and unlabored, speaking in complete sentences. Last took 800mg ibuprofen at 1215AM.

## 2025-01-21 ENCOUNTER — HOSPITAL ENCOUNTER (EMERGENCY)
Age: 25
Discharge: HOME | End: 2025-01-21
Attending: EMERGENCY MEDICINE
Payer: MEDICAID

## 2025-01-21 VITALS
DIASTOLIC BLOOD PRESSURE: 93 MMHG | HEART RATE: 76 BPM | WEIGHT: 179.23 LBS | OXYGEN SATURATION: 99 % | RESPIRATION RATE: 14 BRPM | SYSTOLIC BLOOD PRESSURE: 132 MMHG | TEMPERATURE: 97.9 F | BODY MASS INDEX: 25 KG/M2

## 2025-01-21 DIAGNOSIS — L08.9 LOCAL INFECTION OF SKIN AND SUBCUTANEOUS TISSUE: Primary | ICD-10-CM

## 2025-01-21 PROCEDURE — 6370000000 HC RX 637 (ALT 250 FOR IP)

## 2025-01-21 PROCEDURE — 99283 EMERGENCY DEPT VISIT LOW MDM: CPT | Performed by: EMERGENCY MEDICINE

## 2025-01-21 RX ORDER — SULFAMETHOXAZOLE AND TRIMETHOPRIM 800; 160 MG/1; MG/1
1 TABLET ORAL ONCE
Status: COMPLETED | OUTPATIENT
Start: 2025-01-21 | End: 2025-01-21

## 2025-01-21 RX ORDER — SULFAMETHOXAZOLE AND TRIMETHOPRIM 800; 160 MG/1; MG/1
1 TABLET ORAL 2 TIMES DAILY
Qty: 14 TABLET | Refills: 0 | Status: SHIPPED | OUTPATIENT
Start: 2025-01-21 | End: 2025-01-28

## 2025-01-21 RX ADMIN — SULFAMETHOXAZOLE AND TRIMETHOPRIM 1 TABLET: 800; 160 TABLET ORAL at 08:51

## 2025-01-21 ASSESSMENT — PAIN SCALES - GENERAL: PAINLEVEL_OUTOF10: 3

## 2025-01-21 ASSESSMENT — PAIN - FUNCTIONAL ASSESSMENT: PAIN_FUNCTIONAL_ASSESSMENT: 0-10

## 2025-01-21 NOTE — ED NOTES
Pt to ed via ambulatory to room with complaints of an insect bite to right knee  Pt states it has been there for the past few days  Pt states pain 3/10   Pt denies taking anything pta  Pt unsure if a spider bit his leg  Pt was seen back in December for a similar thing  Pt alert and oriented x4, talking in complete sentences, respirations even and unlabored. Pt acting age appropriate. White board updated, will continue to plan of care

## 2025-01-21 NOTE — ED PROVIDER NOTES
[facility]     Emergency Department     Faculty Attestation  8:49 AM EST      I performed a history and physical examination of the patient and discussed management with the resident. I have reviewed and agree with the resident’s findings including all diagnostic interpretations, and treatment plans as written. Any areas of disagreement are noted on the chart. I was personally present for the key portions of any procedures. I have documented in the chart those procedures where I was not present during the key portions. I have reviewed the emergency nurses triage note. I agree with the chief complaint, past medical history, past surgical history, allergies, medications, social and family history as documented unless otherwise noted below. Documentation of the HPI, Physical Exam and Medical Decision Making performed by scribes is based on my personal performance of the HPI, PE and MDM. For Physician Assistant/ Nurse Practitioner cases/documentation I have personally evaluated this patient and have completed at least one if not all key elements of the E/M (history, physical exam, and MDM). Additional findings are as noted.    Rports 4th bug bite.  Has a qurter size area of edema and erythema jsut inferior lateral to patella, not fluctuant. Seems more indurated.  Was seen in 12/24 for same thing and got bactrim and that helpd      Small area of cellulitis, low concern for abscess  Nothing expressed,    Will do additional course of abx  discharge        KAIN VidalO, M.P.H  Attending Emergency Medicine Physician         Wendy Villalobos,   01/21/25 5383    
workup given recurrent wounds on the right knee.    Amount and/or Complexity of Data Reviewed  External Data Reviewed: notes.    Critical Care  Total time providing critical care: 0 minutes      EMERGENCY DEPARTMENT COURSE:  ED Course as of 01/21/25 0902 Tue Jan 21, 2025   0901 Met with patient at bedside.  Informed him that we will discharge him on antibiotics.  Strict return precautions provided.  Patient provided with referral list for primary care provider to establish care given these recurrent wounds on his right knee. [KR]      ED Course User Index  [KR] Alessandro Gilliland MD       CONSULTS:  None    CRITICAL CARE:  There was significant risk of life threatening deterioration of patient's condition requiring my direct management. Critical care time 0 minutes, excluding any documented procedures.    FINAL IMPRESSION      1. Local infection of skin and subcutaneous tissue          DISPOSITION / PLAN     DISPOSITION Decision To Discharge 01/21/2025 08:48:27 AM   DISPOSITION CONDITION Stable           PATIENT REFERRED TO:  [facility] AT [address] [address] [phone]  Schedule an appointment as soon as possible for a visit   Follow-up recurrent skin infections      DISCHARGE MEDICATIONS:  Current Discharge Medication List        START taking these medications    Details   sulfamethoxazole-trimethoprim (BACTRIM DS;SEPTRA DS) 800-160 MG per tablet Take 1 tablet by mouth 2 times daily for 7 days  Qty: 14 tablet, Refills: 0             Alessandro Gilliland MD  Emergency Medicine Resident    (Please note that portions of this note were completed with a voice recognition program.  Efforts were made to edit the dictations but occasionally words are mis-transcribed.)

## 2025-01-21 NOTE — DISCHARGE INSTRUCTIONS
You were seen in the emergency department for a recurrent wound on your right knee.  Your vital signs were stable.  No fever noted.    This does appear to be infected with the surrounding redness around the wound.  No obvious abscess that needs drained at this time.  We will start you on antibiotics.    Please take these as prescribed and complete the entire antibiotic course.    Please keep the area clean.    Please follow-up with a primary care provider for these recurrent wounds.  I have provided the contact information for [facility].  I have also provided you a list of other family doctors.  Please call and schedule appointment as soon as possible.    Please return to the emergency department if your wound does not improve despite taking antibiotics or if you develop any fevers, difficulty moving your right knee, increasing redness or swelling, or increasing pain.

## 2025-01-27 NOTE — PROGRESS NOTES
----- Message from Yuki sent at 1/27/2025  1:50 PM CST -----  Type:  Patient Order  Call    Who Called:Pt   Would the patient rather a call back or a response via [facility]? Call back   Best Call Back Number: [phone]  Additional Information: requesting labs before his appointment on Thursday   CLINICAL PHARMACY NOTE: MEDS TO [address] Select Patient?: No  Total # of Prescriptions Filled: 2   The following medications were delivered to the patient:  · Depakote  · Paliperidone  ·   Total # of Interventions Completed: 0  Time Spent (min): 30    Additional Documentation:

## 2025-03-19 ENCOUNTER — HOSPITAL ENCOUNTER (EMERGENCY)
Age: 25
Discharge: HOME | End: 2025-03-19
Attending: EMERGENCY MEDICINE

## 2025-03-19 VITALS
SYSTOLIC BLOOD PRESSURE: 117 MMHG | DIASTOLIC BLOOD PRESSURE: 67 MMHG | OXYGEN SATURATION: 98 % | WEIGHT: 170 LBS | RESPIRATION RATE: 16 BRPM | HEART RATE: 59 BPM | HEIGHT: 71 IN | BODY MASS INDEX: 23.8 KG/M2 | TEMPERATURE: 98.1 F

## 2025-03-19 DIAGNOSIS — Z20.2 POSSIBLE EXPOSURE TO STD: Primary | ICD-10-CM

## 2025-03-19 LAB
BACTERIA URNS QL MICRO: NORMAL
BILIRUB UR QL STRIP: NEGATIVE
CASTS #/AREA URNS LPF: NORMAL /LPF (ref 0–8)
CLARITY UR: CLEAR
COLOR UR: YELLOW
EPI CELLS #/AREA URNS HPF: NORMAL /HPF (ref 0–5)
GLUCOSE UR STRIP-MCNC: NEGATIVE MG/DL
HGB UR QL STRIP.AUTO: NEGATIVE
KETONES UR STRIP-MCNC: NEGATIVE MG/DL
LEUKOCYTE ESTERASE UR QL STRIP: NEGATIVE
NITRITE UR QL STRIP: NEGATIVE
PH UR STRIP: 5.5 [PH] (ref 5–8)
PROT UR STRIP-MCNC: NEGATIVE MG/DL
RBC #/AREA URNS HPF: NORMAL /HPF (ref 0–4)
SP GR UR STRIP: 1.03 (ref 1–1.03)
UROBILINOGEN UR STRIP-ACNC: NORMAL EU/DL (ref 0–1)
WBC #/AREA URNS HPF: NORMAL /HPF (ref 0–5)

## 2025-03-19 PROCEDURE — 6370000000 HC RX 637 (ALT 250 FOR IP)

## 2025-03-19 PROCEDURE — 81001 URINALYSIS AUTO W/SCOPE: CPT

## 2025-03-19 PROCEDURE — 87491 CHLMYD TRACH DNA AMP PROBE: CPT

## 2025-03-19 PROCEDURE — 6360000002 HC RX W HCPCS

## 2025-03-19 PROCEDURE — 87591 N.GONORRHOEAE DNA AMP PROB: CPT

## 2025-03-19 PROCEDURE — 87661 TRICHOMONAS VAGINALIS AMPLIF: CPT

## 2025-03-19 PROCEDURE — 99283 EMERGENCY DEPT VISIT LOW MDM: CPT

## 2025-03-19 PROCEDURE — 96372 THER/PROPH/DIAG INJ SC/IM: CPT

## 2025-03-19 RX ORDER — CEFTRIAXONE 500 MG/1
500 INJECTION, POWDER, FOR SOLUTION INTRAMUSCULAR; INTRAVENOUS ONCE
Status: COMPLETED | OUTPATIENT
Start: 2025-03-19 | End: 2025-03-19

## 2025-03-19 RX ORDER — DOXYCYCLINE HYCLATE 100 MG
100 TABLET ORAL 2 TIMES DAILY
Qty: 13 TABLET | Refills: 0 | Status: SHIPPED | OUTPATIENT
Start: 2025-03-19 | End: 2025-03-26

## 2025-03-19 RX ORDER — DOXYCYCLINE HYCLATE 100 MG
100 TABLET ORAL ONCE
Status: COMPLETED | OUTPATIENT
Start: 2025-03-19 | End: 2025-03-19

## 2025-03-19 RX ADMIN — DOXYCYCLINE HYCLATE 100 MG: 100 TABLET ORAL at 17:41

## 2025-03-19 RX ADMIN — CEFTRIAXONE SODIUM 500 MG: 500 INJECTION, POWDER, FOR SOLUTION INTRAMUSCULAR; INTRAVENOUS at 17:41

## 2025-03-19 ASSESSMENT — ENCOUNTER SYMPTOMS
WHEEZING: 0
NAUSEA: 0
SHORTNESS OF BREATH: 0
ABDOMINAL DISTENTION: 0
ABDOMINAL PAIN: 0
COUGH: 0
DIARRHEA: 0
CHEST TIGHTNESS: 0
CONSTIPATION: 0
VOMITING: 0

## 2025-03-19 NOTE — ED PROVIDER NOTES
[facility] EMERGENCY DEPARTMENT  Emergency Department Encounter  Emergency Medicine Resident     Pt Name:Aileen Burger  MRN: [id number]  Birth date 2000  Date of evaluation: 3/19/25  PCP:  Marysol, Pcp  Note Started: 3:47 PM EDT    CHIEF COMPLAINT       Chief Complaint   Patient presents with    Exposure to STD     HISTORY OF PRESENT ILLNESS  (Location/Symptom, Timing/Onset, Context/Setting, Quality, Duration, Modifying Factors, Severity.)      Aileen Burger is a 24 y.o. male with no significant past medical history who presents with a 1 day history of dysuria and penile discharge.  Discharge is off-white in color.  Endorses recent unprotected sexual intercourse with a new female partner. Endorses a history of STI's, last positive test was approximately 10 months to 1 year ago. Denies any rashes, genital lesions, abdominal pain, flank pain, nausea, vomiting, constipation, diarrhea, fever or chills.    PAST MEDICAL / SURGICAL / SOCIAL / FAMILY HISTORY      has a past medical history of ADHD (attention deficit hyperactivity disorder), Fracture, Gestation period, 40 weeks, and Guardianship.     has a past surgical history that includes Wrist Closed Reduction (Left, 8/22/2014) and Hardware Removal (Left, 10/3/14).    Social History     Socioeconomic History    Marital status: Single     Spouse name: Not on file    Number of children: Not on file    Years of education: Not on file    Highest education level: Not on file   Occupational History    Not on file   Tobacco Use    Smoking status: Passive Smoke Exposure - Never Smoker    Smokeless tobacco: Never   Vaping Use    Vaping status: Some Days   Substance and Sexual Activity    Alcohol use: No    Drug use: Yes     Types: Marijuana (Weed)    Sexual activity: Not on file   Other Topics Concern    Not on file   Social History Narrative    Not on file     Social Drivers of Health     Financial Resource Strain: Not on file   Food Insecurity: Not on file   Transportation

## 2025-03-19 NOTE — ED PROVIDER NOTES
[facility] EMERGENCY DEPARTMENT  eMERGENCY dEPARTMENT eNCOUnter   Attending Attestation     Pt Name: Aileen Burger  MRN: [id number]  Birthdate 2000  Date of evaluation: 3/19/25       Aileen Burger is a 24 y.o. male who presents with Exposure to STD      5:41 PM EDT      History: Patient presents after exposure to STD.  Patient is having burning with urination, discharge.  Patient has no other complaints.  Patient has history of STI.    Exam: Heart rate and rhythm are regular.  Lungs are clear to auscultation bilaterally.  Abdomen is soft, nontender.  Patient is awake and alert and acting appropriately.  Please see resident note for genital exam.    Plan for testing and treatment.  Will discharge.    I performed a history and physical examination of the patient and discussed management with the resident. I reviewed the resident’s note and agree with the documented findings and plan of care. Any areas of disagreement are noted on the chart. I was personally present for the key portions of any procedures. I have documented in the chart those procedures where I was not present during the key portions. I have personally reviewed all images and agree with the resident's interpretation. I have reviewed the emergency nurses triage note. I agree with the chief complaint, past medical history, past surgical history, allergies, medications, social and family history as documented unless otherwise noted below. Documentation of the HPI, Physical Exam and Medical Decision Making performed by medical students or scribes is based on my personal performance of the HPI, PE and MDM. For Phys Assistant/ Nurse Practitioner cases/documentation I have had a face to face evaluation of this patient and have completed at least one if not all key elements of the E/M (history, physical exam, and MDM). Additional findings are as noted.    For APC cases I have personally evaluated and examined the patient in conjunction with the APC and

## 2025-03-19 NOTE — DISCHARGE INSTRUCTIONS
You were seen in the emergency department for possible exposure to STD.  Your vital signs were stable.  Lab work was within normal limits.  You were treated with ceftriaxone  mg for 1 dose and doxycycline  mg for 1 dose.     You were discharged with a prescription for doxycycline 100 mg twice daily for 13 doses, starting tonight.       Please follow-up with [facility] to establish care and for follow-up of STI results.  Call to schedule appointment.     Please return to the Emergency Department if your symptoms worsen or you start experiencing chest pain, shortness of breath, lightheadedness, dizziness, loss of consciousness, active bleeding.

## 2025-03-19 NOTE — ED NOTES
Pt is A+Ox4  Pt complains of dysuria and penile discharge for one day  Pt denies abdominal pain  Pt denies fever at home  Pt denies nausea or vomiting  All questions answered and needs met at this time  Whiteboard updated

## 2025-03-21 LAB
SOURCE: NORMAL
TRICHOMONAS VAGINALI, MOLECULAR: NEGATIVE

## 2025-05-05 ENCOUNTER — HOSPITAL ENCOUNTER (OUTPATIENT)
Dept: GENERAL RADIOLOGY | Age: 25
Discharge: HOME | End: 2025-05-07

## 2025-05-05 ENCOUNTER — HOSPITAL ENCOUNTER (OUTPATIENT)
Age: 25
Discharge: HOME | End: 2025-05-05

## 2025-05-05 DIAGNOSIS — Z02.1 PRE-EMPLOYMENT HEALTH SCREENING EXAMINATION: ICD-10-CM

## 2025-05-05 LAB
BASOPHILS # BLD: 0.03 K/UL (ref 0–0.2)
BASOPHILS NFR BLD: 1 % (ref 0–2)
BILIRUB UR QL STRIP: NEGATIVE
BUN SERPL-MCNC: 17 MG/DL (ref 6–20)
CLARITY UR: CLEAR
COLOR UR: YELLOW
COMMENT: NORMAL
CREAT SERPL-MCNC: 1 MG/DL (ref 0.7–1.2)
EOSINOPHIL # BLD: 0.05 K/UL (ref 0–0.44)
EOSINOPHILS RELATIVE PERCENT: 1 % (ref 1–4)
ERYTHROCYTE [DISTWIDTH] IN BLOOD BY AUTOMATED COUNT: 13.5 % (ref 11.8–14.4)
GFR, ESTIMATED: >90 ML/MIN/1.73M2
GLUCOSE UR STRIP-MCNC: NEGATIVE MG/DL
HCT VFR BLD AUTO: 45.1 % (ref 40.7–50.3)
HGB BLD-MCNC: 14.1 G/DL (ref 13–17)
HGB UR QL STRIP.AUTO: NEGATIVE
IMM GRANULOCYTES # BLD AUTO: <0.03 K/UL (ref 0–0.3)
IMM GRANULOCYTES NFR BLD: 0 %
KETONES UR STRIP-MCNC: NEGATIVE MG/DL
LEUKOCYTE ESTERASE UR QL STRIP: NEGATIVE
LYMPHOCYTES NFR BLD: 1.54 K/UL (ref 1.1–3.7)
LYMPHOCYTES RELATIVE PERCENT: 38 % (ref 24–43)
MCH RBC QN AUTO: 27 PG (ref 25.2–33.5)
MCHC RBC AUTO-ENTMCNC: 31.3 G/DL (ref 28.4–34.8)
MCV RBC AUTO: 86.4 FL (ref 82.6–102.9)
MONOCYTES NFR BLD: 0.27 K/UL (ref 0.1–1.2)
MONOCYTES NFR BLD: 7 % (ref 3–12)
NEUTROPHILS NFR BLD: 53 % (ref 36–65)
NEUTS SEG NFR BLD: 2.12 K/UL (ref 1.5–8.1)
NITRITE UR QL STRIP: NEGATIVE
NRBC BLD-RTO: 0 PER 100 WBC
PH UR STRIP: 6 [PH] (ref 5–8)
PLATELET # BLD AUTO: 254 K/UL (ref 138–453)
PMV BLD AUTO: 9.2 FL (ref 8.1–13.5)
PROT UR STRIP-MCNC: NEGATIVE MG/DL
RBC # BLD AUTO: 5.22 M/UL (ref 4.21–5.77)
SP GR UR STRIP: 1.02 (ref 1–1.03)
UROBILINOGEN UR STRIP-ACNC: NORMAL EU/DL (ref 0–1)
WBC OTHER # BLD: 4 K/UL (ref 3.5–11.3)

## 2025-05-05 PROCEDURE — 84202 ASSAY RBC PROTOPORPHYRIN: CPT

## 2025-05-05 PROCEDURE — 83655 ASSAY OF LEAD: CPT

## 2025-05-05 PROCEDURE — 82232 ASSAY OF BETA-2 PROTEIN: CPT

## 2025-05-05 PROCEDURE — 71045 X-RAY EXAM CHEST 1 VIEW: CPT

## 2025-05-05 PROCEDURE — 84520 ASSAY OF UREA NITROGEN: CPT

## 2025-05-05 PROCEDURE — 82565 ASSAY OF CREATININE: CPT

## 2025-05-05 PROCEDURE — 82300 ASSAY OF CADMIUM: CPT

## 2025-05-05 PROCEDURE — 85025 COMPLETE CBC W/AUTO DIFF WBC: CPT

## 2025-05-05 PROCEDURE — 81003 URINALYSIS AUTO W/O SCOPE: CPT

## 2025-05-07 LAB
BETA 2 MICROGLOBULIN UG/G CRT: 33 UG/G CRT (ref 0–300)
BETA-2 MICROGLOB.,U: 38 UG/L (ref 0–300)
CADMIUM BLD-MCNC: <1 UG/L
CREAT 24H UR-MCNC: 114 MG/DL
LEAD BLDV-MCNC: <2 UG/DL
PH, URINE: 6
ZPP TO HEME RATIO: 48 UMOLZPP/MOLHEM (ref 0–69)

## 2025-05-08 LAB
CADMIUM, URINE /24 HR: NORMAL UG/D (ref 0–3.2)
CADMIUM, URINE /VOLUME: <1 UG/L (ref 0–1)
CADMIUM, URINE RATIO CREATININE: NORMAL UG/G CRT (ref 0–3.2)
COLLECT DURATION TIME SPEC: NORMAL H
CREAT 24H UR-MCNC: 114 MG/DL
CREATININE URINE /24 HR: NORMAL MG/D (ref 1000–2500)
SPECIMEN VOL ?TM UR: NORMAL ML